# Patient Record
Sex: FEMALE | Race: WHITE | NOT HISPANIC OR LATINO | URBAN - METROPOLITAN AREA
[De-identification: names, ages, dates, MRNs, and addresses within clinical notes are randomized per-mention and may not be internally consistent; named-entity substitution may affect disease eponyms.]

---

## 2018-08-06 ENCOUNTER — INPATIENT (INPATIENT)
Facility: HOSPITAL | Age: 58
LOS: 3 days | Discharge: HOME | End: 2018-08-10
Attending: HOSPITALIST | Admitting: HOSPITALIST
Payer: COMMERCIAL

## 2018-08-06 VITALS
HEART RATE: 96 BPM | SYSTOLIC BLOOD PRESSURE: 175 MMHG | DIASTOLIC BLOOD PRESSURE: 105 MMHG | RESPIRATION RATE: 18 BRPM | OXYGEN SATURATION: 100 % | TEMPERATURE: 98 F

## 2018-08-06 DIAGNOSIS — F10.239 ALCOHOL DEPENDENCE WITH WITHDRAWAL, UNSPECIFIED: ICD-10-CM

## 2018-08-06 DIAGNOSIS — G62.9 POLYNEUROPATHY, UNSPECIFIED: ICD-10-CM

## 2018-08-06 DIAGNOSIS — Z87.898 PERSONAL HISTORY OF OTHER SPECIFIED CONDITIONS: ICD-10-CM

## 2018-08-06 DIAGNOSIS — Z29.9 ENCOUNTER FOR PROPHYLACTIC MEASURES, UNSPECIFIED: ICD-10-CM

## 2018-08-06 LAB
ALBUMIN SERPL ELPH-MCNC: 4.9 G/DL — SIGNIFICANT CHANGE UP (ref 3.3–5)
ALBUMIN SERPL ELPH-MCNC: 5.7 G/DL — HIGH (ref 3.3–5)
ALP SERPL-CCNC: 70 U/L — SIGNIFICANT CHANGE UP (ref 40–120)
ALP SERPL-CCNC: 81 U/L — SIGNIFICANT CHANGE UP (ref 40–120)
ALT FLD-CCNC: 43 U/L — HIGH (ref 4–33)
ALT FLD-CCNC: 57 U/L — HIGH (ref 4–33)
AMPHET UR-MCNC: NEGATIVE — SIGNIFICANT CHANGE UP
AMYLASE P1 CFR SERPL: 55 U/L — SIGNIFICANT CHANGE UP (ref 25–125)
APAP SERPL-MCNC: < 15 UG/ML — LOW (ref 15–25)
APPEARANCE UR: CLEAR — SIGNIFICANT CHANGE UP
AST SERPL-CCNC: 42 U/L — HIGH (ref 4–32)
AST SERPL-CCNC: 55 U/L — HIGH (ref 4–32)
BACTERIA # UR AUTO: SIGNIFICANT CHANGE UP
BARBITURATES UR SCN-MCNC: NEGATIVE — SIGNIFICANT CHANGE UP
BASE EXCESS BLDV CALC-SCNC: 4.5 MMOL/L — SIGNIFICANT CHANGE UP
BASOPHILS # BLD AUTO: 0.09 K/UL — SIGNIFICANT CHANGE UP (ref 0–0.2)
BASOPHILS NFR BLD AUTO: 1.1 % — SIGNIFICANT CHANGE UP (ref 0–2)
BENZODIAZ UR-MCNC: NEGATIVE — SIGNIFICANT CHANGE UP
BILIRUB DIRECT SERPL-MCNC: 0.1 MG/DL — SIGNIFICANT CHANGE UP (ref 0.1–0.2)
BILIRUB SERPL-MCNC: 0.5 MG/DL — SIGNIFICANT CHANGE UP (ref 0.2–1.2)
BILIRUB SERPL-MCNC: 0.5 MG/DL — SIGNIFICANT CHANGE UP (ref 0.2–1.2)
BILIRUB UR-MCNC: NEGATIVE — SIGNIFICANT CHANGE UP
BLOOD GAS VENOUS - CREATININE: 0.37 MG/DL — LOW (ref 0.5–1.3)
BLOOD UR QL VISUAL: NEGATIVE — SIGNIFICANT CHANGE UP
BUN SERPL-MCNC: 11 MG/DL — SIGNIFICANT CHANGE UP (ref 7–23)
CALCIUM SERPL-MCNC: 9.6 MG/DL — SIGNIFICANT CHANGE UP (ref 8.4–10.5)
CANNABINOIDS UR-MCNC: NEGATIVE — SIGNIFICANT CHANGE UP
CHLORIDE BLDV-SCNC: 102 MMOL/L — SIGNIFICANT CHANGE UP (ref 96–108)
CHLORIDE SERPL-SCNC: 96 MMOL/L — LOW (ref 98–107)
CO2 SERPL-SCNC: 25 MMOL/L — SIGNIFICANT CHANGE UP (ref 22–31)
COCAINE METAB.OTHER UR-MCNC: NEGATIVE — SIGNIFICANT CHANGE UP
COLOR SPEC: SIGNIFICANT CHANGE UP
CREAT SERPL-MCNC: 0.48 MG/DL — LOW (ref 0.5–1.3)
EOSINOPHIL # BLD AUTO: 0.01 K/UL — SIGNIFICANT CHANGE UP (ref 0–0.5)
EOSINOPHIL NFR BLD AUTO: 0.1 % — SIGNIFICANT CHANGE UP (ref 0–6)
ETHANOL BLD-MCNC: < 10 MG/DL — SIGNIFICANT CHANGE UP
GAS PNL BLDV: 137 MMOL/L — SIGNIFICANT CHANGE UP (ref 136–146)
GLUCOSE BLDV-MCNC: 92 — SIGNIFICANT CHANGE UP (ref 70–99)
GLUCOSE SERPL-MCNC: 93 MG/DL — SIGNIFICANT CHANGE UP (ref 70–99)
GLUCOSE UR-MCNC: NEGATIVE — SIGNIFICANT CHANGE UP
HCO3 BLDV-SCNC: 27 MMOL/L — SIGNIFICANT CHANGE UP (ref 20–27)
HCT VFR BLD CALC: 39.4 % — SIGNIFICANT CHANGE UP (ref 34.5–45)
HCT VFR BLDV CALC: 42.7 % — SIGNIFICANT CHANGE UP (ref 34.5–45)
HGB BLD-MCNC: 13.2 G/DL — SIGNIFICANT CHANGE UP (ref 11.5–15.5)
HGB BLDV-MCNC: 13.9 G/DL — SIGNIFICANT CHANGE UP (ref 11.5–15.5)
IMM GRANULOCYTES # BLD AUTO: 0.07 # — SIGNIFICANT CHANGE UP
IMM GRANULOCYTES NFR BLD AUTO: 0.9 % — SIGNIFICANT CHANGE UP (ref 0–1.5)
KETONES UR-MCNC: SIGNIFICANT CHANGE UP
LACTATE BLDV-MCNC: 1.8 MMOL/L — SIGNIFICANT CHANGE UP (ref 0.5–2)
LEUKOCYTE ESTERASE UR-ACNC: HIGH
LIDOCAIN IGE QN: 33.3 U/L — SIGNIFICANT CHANGE UP (ref 7–60)
LYMPHOCYTES # BLD AUTO: 0.81 K/UL — LOW (ref 1–3.3)
LYMPHOCYTES # BLD AUTO: 10.1 % — LOW (ref 13–44)
MAGNESIUM SERPL-MCNC: 1.7 MG/DL — SIGNIFICANT CHANGE UP (ref 1.6–2.6)
MAGNESIUM SERPL-MCNC: 1.8 MG/DL — SIGNIFICANT CHANGE UP (ref 1.6–2.6)
MCHC RBC-ENTMCNC: 30.3 PG — SIGNIFICANT CHANGE UP (ref 27–34)
MCHC RBC-ENTMCNC: 33.5 % — SIGNIFICANT CHANGE UP (ref 32–36)
MCV RBC AUTO: 90.4 FL — SIGNIFICANT CHANGE UP (ref 80–100)
METHADONE UR-MCNC: NEGATIVE — SIGNIFICANT CHANGE UP
MONOCYTES # BLD AUTO: 0.58 K/UL — SIGNIFICANT CHANGE UP (ref 0–0.9)
MONOCYTES NFR BLD AUTO: 7.3 % — SIGNIFICANT CHANGE UP (ref 2–14)
NEUTROPHILS # BLD AUTO: 6.44 K/UL — SIGNIFICANT CHANGE UP (ref 1.8–7.4)
NEUTROPHILS NFR BLD AUTO: 80.5 % — HIGH (ref 43–77)
NITRITE UR-MCNC: NEGATIVE — SIGNIFICANT CHANGE UP
NRBC # FLD: 0 — SIGNIFICANT CHANGE UP
OPIATES UR-MCNC: NEGATIVE — SIGNIFICANT CHANGE UP
OXYCODONE UR-MCNC: NEGATIVE — SIGNIFICANT CHANGE UP
PCO2 BLDV: 40 MMHG — LOW (ref 41–51)
PCP UR-MCNC: NEGATIVE — SIGNIFICANT CHANGE UP
PH BLDV: 7.46 PH — HIGH (ref 7.32–7.43)
PH UR: 8 — SIGNIFICANT CHANGE UP (ref 4.6–8)
PHOSPHATE SERPL-MCNC: 2.1 MG/DL — LOW (ref 2.5–4.5)
PHOSPHATE SERPL-MCNC: 2.2 MG/DL — LOW (ref 2.5–4.5)
PLATELET # BLD AUTO: 345 K/UL — SIGNIFICANT CHANGE UP (ref 150–400)
PMV BLD: 9 FL — SIGNIFICANT CHANGE UP (ref 7–13)
PO2 BLDV: 26 MMHG — LOW (ref 35–40)
POTASSIUM BLDV-SCNC: 3.7 MMOL/L — SIGNIFICANT CHANGE UP (ref 3.4–4.5)
POTASSIUM SERPL-MCNC: 4 MMOL/L — SIGNIFICANT CHANGE UP (ref 3.5–5.3)
POTASSIUM SERPL-SCNC: 4 MMOL/L — SIGNIFICANT CHANGE UP (ref 3.5–5.3)
PROT SERPL-MCNC: 7.5 G/DL — SIGNIFICANT CHANGE UP (ref 6–8.3)
PROT SERPL-MCNC: 8.4 G/DL — HIGH (ref 6–8.3)
PROT UR-MCNC: NEGATIVE MG/DL — SIGNIFICANT CHANGE UP
RBC # BLD: 4.36 M/UL — SIGNIFICANT CHANGE UP (ref 3.8–5.2)
RBC # FLD: 13.8 % — SIGNIFICANT CHANGE UP (ref 10.3–14.5)
RBC CASTS # UR COMP ASSIST: SIGNIFICANT CHANGE UP (ref 0–?)
SALICYLATES SERPL-MCNC: < 5 MG/DL — LOW (ref 15–30)
SAO2 % BLDV: 45.7 % — LOW (ref 60–85)
SODIUM SERPL-SCNC: 140 MMOL/L — SIGNIFICANT CHANGE UP (ref 135–145)
SP GR SPEC: 1 — SIGNIFICANT CHANGE UP (ref 1–1.04)
TSH SERPL-MCNC: 0.91 UIU/ML — SIGNIFICANT CHANGE UP (ref 0.27–4.2)
UROBILINOGEN FLD QL: NORMAL MG/DL — SIGNIFICANT CHANGE UP
WBC # BLD: 8 K/UL — SIGNIFICANT CHANGE UP (ref 3.8–10.5)
WBC # FLD AUTO: 8 K/UL — SIGNIFICANT CHANGE UP (ref 3.8–10.5)
WBC UR QL: SIGNIFICANT CHANGE UP (ref 0–?)

## 2018-08-06 PROCEDURE — 99223 1ST HOSP IP/OBS HIGH 75: CPT

## 2018-08-06 RX ORDER — MAGNESIUM SULFATE 500 MG/ML
1 VIAL (ML) INJECTION ONCE
Qty: 0 | Refills: 0 | Status: COMPLETED | OUTPATIENT
Start: 2018-08-06 | End: 2018-08-06

## 2018-08-06 RX ADMIN — Medication 50 MILLIGRAM(S): at 16:45

## 2018-08-06 RX ADMIN — Medication 100 GRAM(S): at 22:58

## 2018-08-06 RX ADMIN — Medication 50 MILLIGRAM(S): at 15:22

## 2018-08-06 RX ADMIN — Medication 2 MILLIGRAM(S): at 21:58

## 2018-08-06 NOTE — H&P ADULT - NSHPREVIEWOFSYSTEMS_GEN_ALL_CORE
Review of Systems:   CONSTITUTIONAL: No fever, weight loss, or fatigue  EYES: No eye pain, visual disturbances, or discharge  ENMT:  No difficulty hearing, tinnitus, vertigo; No sinus or throat pain  NECK: No pain or stiffness  RESPIRATORY: No cough, wheezing, chills or hemoptysis; No shortness of breath  CARDIOVASCULAR: No chest pain, palpitations, dizziness, or leg swelling  GASTROINTESTINAL: No abdominal or epigastric pain. No nausea, vomiting, or hematemesis; No diarrhea or constipation. No melena or hematochezia.  GENITOURINARY: No dysuria, frequency, hematuria, or incontinence  NEUROLOGICAL: No headaches, memory loss, loss of strength, numbness, or tremors  SKIN: No itching, burning, rashes, or lesions   LYMPH NODES: No enlarged glands  ENDOCRINE: No heat or cold intolerance; No hair loss  MUSCULOSKELETAL: No joint pain or swelling; No muscle, back, or extremity pain  PSYCHIATRIC: + depression  HEME/LYMPH: No easy bruising, or bleeding gums  ALLERY AND IMMUNOLOGIC: No hives or eczema

## 2018-08-06 NOTE — ED ADULT NURSE REASSESSMENT NOTE - NS ED NURSE REASSESS COMMENT FT1
Pt moved to ambulance bay for closer monitoring until room assignment.  Ambulance triage RN Barbie given report.   with pt.

## 2018-08-06 NOTE — H&P ADULT - NSHPLABSRESULTS_GEN_ALL_CORE
Vital Signs Last 24 Hrs  T(C): 36.7 (06 Aug 2018 22:00), Max: 37 (06 Aug 2018 18:24)  T(F): 98 (06 Aug 2018 22:00), Max: 98.6 (06 Aug 2018 18:24)  HR: 83 (06 Aug 2018 22:00) (83 - 97)  BP: 142/87 (06 Aug 2018 22:00) (142/87 - 192/105)  BP(mean): --  RR: 18 (06 Aug 2018 22:00) (16 - 18)  SpO2: 98% (06 Aug 2018 22:00) (97% - 100%)                          13.2   8.00  )-----------( 345      ( 06 Aug 2018 13:45 )             39.4     08-06    140  |  96<L>  |  11  ----------------------------<  93  4.0   |  25  |  0.48<L>    Ca    9.6      06 Aug 2018 13:45  Phos  2.1     08-  Mg     1.7     08-    TPro  8.4<H>  /  Alb  5.7<H>  /  TBili  0.5  /  DBili  x   /  AST  55<H>  /  ALT  57<H>  /  AlkPhos  81  08-06    CAPILLARY BLOOD GLUCOSE      POCT Blood Glucose.: 89 mg/dL (06 Aug 2018 15:14)      Urinalysis Basic - ( 06 Aug 2018 17:14 )    Color: LT. YELLOW / Appearance: CLEAR / S.005 / pH: 8.0  Gluc: NEGATIVE / Ketone: TRACE  / Bili: NEGATIVE / Urobili: NORMAL mg/dL   Blood: NEGATIVE / Protein: NEGATIVE mg/dL / Nitrite: NEGATIVE   Leuk Esterase: SMALL / RBC: 0-2 / WBC 10-25   Sq Epi: x / Non Sq Epi: x / Bacteria: FEW

## 2018-08-06 NOTE — SBIRT NOTE. - NSSBIRTSERVICES_GEN_A_ED_FT
Provided SBIRT services: Full screen positive. Referral to Treatment Performed. Screening results were reviewed with the patient and patient was provided information about healthy guidelines and potential negative consequences associated with level of risk. Motivation and readiness to reduce or stop use was discussed and goals and activities to make changes were suggested/offered.    Referral for complete assessment and level of care determination at a certified treatment facility was completed by contacting the treatment facility via phone, and add apt info as noted below:  Appt confirmed at: [facility] Outpatient tx 8/9 1:30pm  Audit Score: 26  DAST Score: 0  Duration = 30 Minutes

## 2018-08-06 NOTE — ED ADULT TRIAGE NOTE - CHIEF COMPLAINT QUOTE
"I have been drinking too much."  Sent by PMD for eval of alcohol dependence and ?DT's.   Pt last drink last night.  tremulous in triage.   h/o ETOH

## 2018-08-06 NOTE — ED PROVIDER NOTE - OBJECTIVE STATEMENT
59 yo F c PMH of alcohol dependence p/w alcohol withdrawal. Pt drinks on and off for 15 years 57 yo F c PMH of alcohol dependence p/w alcohol withdrawal. Pt drinks on and off for 15 years, q week or q other week. pt has drank 1L of vodka q day for the past 4 days, last drink last night, unknown time. Pt's bilat hands became stiff, went to PMD who sent pt in for "DTs," however pt denies ever having a seizure, pt denies hx of hospitalization for alcohol related sx. not on blood thinners. denies head trauma or LOC.     ROS positive: hand stiffness, alcohol use, n/v  ROS negative: f/c, CP, SOB, abdominal pain, hematemesis, bloody stool, head trauma, LOC, seizure

## 2018-08-06 NOTE — H&P ADULT - PROBLEM SELECTOR PLAN 1
-CIWA  -fall and seizure precautions   -pt has appointment for detox this Thursday at [facility]

## 2018-08-06 NOTE — ED PROVIDER NOTE - PHYSICAL EXAMINATION
NEURO: pupils 3 mm, PERRL, EOMI (CN III, IV, VI), facial sensation intact to light touch in all 3 divisions bilat (CN V), face is symmetric with normal eye closure, eye opening, and smile (CN VII), hearing is normal to rubbing fingers (CN VII), palate elevates symmetrically, phonation is normal (CN IX, X),  shoulder shrug intact bilat (CN XI), tongue is midline with nl movements and no atrophy (CN XII), finger to nose test nl bilat,  speech is clear; 5/5 motor strength bilat hand  and bilat plantar flexion; sensation intact to light touch BLE and BLE C5-T1 and L3-S1; gait wnl

## 2018-08-06 NOTE — ED ADULT NURSE NOTE - OBJECTIVE STATEMENT
RN Break Coverage: received pt to room 28 for evaluation, sent by PMD for etoh abuse x 3days now tremulous with vomiting x 4 today. pt family reports pt had an episode of unresponsiveness and arm "stiffness" today, with intermittent episodes of confusion over the past few days, and not getting out of bed. pt admits to drinking a pint of vodka a day over the past few days, last drink 8pm last night. denies any additional tobacco or substance abuse. pt noted to be tremulous, awake a&ox4, appropriately answering questions. denies ha, dizziness or visual disturbances. denies ah/vh/hi/si. pt calm, cooperative. skin warm, dry, flushed, intact. respirations even unlabored. denies cp or sob. abdomen soft nontender nondistended. endorses nausea, no active vomiting, but endorses vomit was non-bloody. denies diarrhea or constipation. denies fevers or chills. ivl placed. bloods drawn and sent. vs as noted. safety maintained, family at bedside.

## 2018-08-06 NOTE — ED ADULT NURSE NOTE - INTERVENTIONS DEFINITIONS
Non-slip footwear when patient is off stretcher/Call bell, personal items and telephone within reach/Physically safe environment: no spills, clutter or unnecessary equipment/Reinforce activity limits and safety measures with patient and family/Instruct patient to call for assistance/[place] to call system/Stretcher in lowest position, wheels locked, appropriate side rails in place/Monitor gait and stability

## 2018-08-06 NOTE — ED PROVIDER NOTE - MEDICAL DECISION MAKING DETAILS
59 yo F c PMH of alcohol dependence p/w alcohol withdrawal, sent by PMD for "DT", pt states her bilat hands were stiff but denies seizure activity. On exam, HR 96, /105, VS otherwise wnl, non-focal neuro exam, no resting tremor or tongue fasciculations. labs pending, IVF for tx, will reassess.

## 2018-08-06 NOTE — H&P ADULT - HISTORY OF PRESENT ILLNESS
57 yo f with long-standing h/o etoh abuse, detox attempts with Antabuse. At behest of spouse comes to ed after recent 4 day drinking binge (1 L vodka each of last 4 days) now with tremors, anxiety; HR max 97, bp 175/105. Pt denies prior h/o seizures or withdrawal. 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 Pt also notes stopping zoloft last month for depression after 6 months of taking. Felt it wasn't helping; denies worsening depressive symptoms SI or intent to self-harm.  Notes improved symptoms  since librium given in ed 57 yo f with long-standing h/o etoh abuse, detox attempts with Antabuse. At behest of spouse comes to ed after recent 4 day drinking binge (1 L vodka each of last 4 days) now with tremors, anxiety; HR max 97, bp 175/105. Pt denies prior h/o seizures or withdrawal. 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 Pt also notes stopping zoloft last month for depression after 6 months of taking. Felt it wasn't helping; denies worsening depressive symptoms SI or intent to self-harm.  Notes improved symptoms  since librium given in ed  Reports BL hand numbness and weakness for 15 minutes that spontaneously resolved  Notes 2 syncopal events in last year; Last incident  6 months ago. Both occurred while in bathroom. Denies recent lightheadedness, falls, palpitations, dizziness, sob

## 2018-08-06 NOTE — H&P ADULT - NSHPPHYSICALEXAM_GEN_ALL_CORE
PHYSICAL EXAM:      Constitutional: NAD, well-groomed, well-developed  HEENT: EOMI, Normal Hearing  Neck: No LAD, No JVD  Back: Normal spine flexure, No CVA tenderness  Respiratory: CTABCardiovascular: S1 and S2, RRR, no M/G/R  Gastrointestinal: BS+, soft, NT/ND  Extremities: No peripheral edema  Vascular: 2+ peripheral pulses  Neurological: A/O x 3, mild diffuse tremulousness. No numbness  Psychiatric: Normal mood, normal affect. Denies intent to self-harm or SI  Musculoskeletal: 5/5 strength b/l upper and lower extremities  Skin: No rashes

## 2018-08-06 NOTE — ED PROVIDER NOTE - ENMT, MLM
Airway patent, Nasal mucosa clear. Mouth with normal mucosa. Throat has no vesicles, no oropharyngeal exudates, no tongue fasciculations

## 2018-08-06 NOTE — H&P ADULT - PROBLEM SELECTOR PLAN 2
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

## 2018-08-06 NOTE — ED PROVIDER NOTE - PROGRESS NOTE DETAILS
on reassessment pt in NAD, no tremors or tongue fasciculations, /105  will give 2nd dose of librium 50 mg and admit

## 2018-08-06 NOTE — ED PROVIDER NOTE - ATTENDING CONTRIBUTION TO CARE
I have seen and examined the patient face to face, have reviewed and addended the HPI, PE and a/p as necessary.    57 yo F with ETOH dependence a/w ETOH withdrawal after seeing her PMD who reported concerns for delirium tremens.  Pt reported drinking 1L of vodka daily x 4 days with last drink yesterday.  No history of seizures, never had withdrawals.    PE: psychomotor agitation, constantly moving, hypertensive on vitals with tachycardic on exam.  Mild tremor noted, S1S2 tachycardic no murmur gallops rubs, ctabl, abd soft ntnd no rebound no guarding, no le edema, mild tremor noted.  CIWA on my exam 10.      57 yo F a/w ETOH withdrawal here with CIWA 10  - Librium 50mg now  - CIWA eval   - DIscussed with patient options for detox and or outpt rehab.  Pt declined at this time would like medical management at this time.  Reports never having ETOH seizures and withdrawal prior.

## 2018-08-07 LAB
ALBUMIN SERPL ELPH-MCNC: 4.7 G/DL — SIGNIFICANT CHANGE UP (ref 3.3–5)
ALP SERPL-CCNC: 74 U/L — SIGNIFICANT CHANGE UP (ref 40–120)
ALT FLD-CCNC: 44 U/L — HIGH (ref 4–33)
AST SERPL-CCNC: 46 U/L — HIGH (ref 4–32)
BILIRUB SERPL-MCNC: 0.6 MG/DL — SIGNIFICANT CHANGE UP (ref 0.2–1.2)
BUN SERPL-MCNC: 14 MG/DL — SIGNIFICANT CHANGE UP (ref 7–23)
CALCIUM SERPL-MCNC: 9.9 MG/DL — SIGNIFICANT CHANGE UP (ref 8.4–10.5)
CHLORIDE SERPL-SCNC: 97 MMOL/L — LOW (ref 98–107)
CO2 SERPL-SCNC: 23 MMOL/L — SIGNIFICANT CHANGE UP (ref 22–31)
CREAT SERPL-MCNC: 0.5 MG/DL — SIGNIFICANT CHANGE UP (ref 0.5–1.3)
GLUCOSE SERPL-MCNC: 94 MG/DL — SIGNIFICANT CHANGE UP (ref 70–99)
HCT VFR BLD CALC: 39.8 % — SIGNIFICANT CHANGE UP (ref 34.5–45)
HGB BLD-MCNC: 13.5 G/DL — SIGNIFICANT CHANGE UP (ref 11.5–15.5)
MAGNESIUM SERPL-MCNC: 2.2 MG/DL — SIGNIFICANT CHANGE UP (ref 1.6–2.6)
MAGNESIUM SERPL-MCNC: 2.2 MG/DL — SIGNIFICANT CHANGE UP (ref 1.6–2.6)
MCHC RBC-ENTMCNC: 31.2 PG — SIGNIFICANT CHANGE UP (ref 27–34)
MCHC RBC-ENTMCNC: 33.9 % — SIGNIFICANT CHANGE UP (ref 32–36)
MCV RBC AUTO: 91.9 FL — SIGNIFICANT CHANGE UP (ref 80–100)
NRBC # FLD: 0 — SIGNIFICANT CHANGE UP
PHOSPHATE SERPL-MCNC: 3.5 MG/DL — SIGNIFICANT CHANGE UP (ref 2.5–4.5)
PHOSPHATE SERPL-MCNC: 3.6 MG/DL — SIGNIFICANT CHANGE UP (ref 2.5–4.5)
PLATELET # BLD AUTO: 288 K/UL — SIGNIFICANT CHANGE UP (ref 150–400)
PMV BLD: 9.4 FL — SIGNIFICANT CHANGE UP (ref 7–13)
POTASSIUM SERPL-MCNC: 3.4 MMOL/L — LOW (ref 3.5–5.3)
POTASSIUM SERPL-SCNC: 3.4 MMOL/L — LOW (ref 3.5–5.3)
PROT SERPL-MCNC: 7.7 G/DL — SIGNIFICANT CHANGE UP (ref 6–8.3)
RBC # BLD: 4.33 M/UL — SIGNIFICANT CHANGE UP (ref 3.8–5.2)
RBC # FLD: 14 % — SIGNIFICANT CHANGE UP (ref 10.3–14.5)
SODIUM SERPL-SCNC: 138 MMOL/L — SIGNIFICANT CHANGE UP (ref 135–145)
WBC # BLD: 4.37 K/UL — SIGNIFICANT CHANGE UP (ref 3.8–10.5)
WBC # FLD AUTO: 4.37 K/UL — SIGNIFICANT CHANGE UP (ref 3.8–10.5)

## 2018-08-07 PROCEDURE — 99233 SBSQ HOSP IP/OBS HIGH 50: CPT

## 2018-08-07 RX ORDER — POTASSIUM CHLORIDE 20 MEQ
40 PACKET (EA) ORAL ONCE
Qty: 0 | Refills: 0 | Status: COMPLETED | OUTPATIENT
Start: 2018-08-07 | End: 2018-08-07

## 2018-08-07 RX ADMIN — Medication 2 MILLIGRAM(S): at 02:04

## 2018-08-07 RX ADMIN — Medication 2 MILLIGRAM(S): at 18:05

## 2018-08-07 RX ADMIN — Medication 2 MILLIGRAM(S): at 11:03

## 2018-08-07 RX ADMIN — Medication 40 MILLIEQUIVALENT(S): at 18:05

## 2018-08-07 RX ADMIN — Medication 2 MILLIGRAM(S): at 15:03

## 2018-08-07 RX ADMIN — Medication 2 MILLIGRAM(S): at 05:48

## 2018-08-07 RX ADMIN — Medication 1.5 MILLIGRAM(S): at 21:59

## 2018-08-07 RX ADMIN — Medication 2 MILLIGRAM(S): at 04:00

## 2018-08-08 DIAGNOSIS — R30.0 DYSURIA: ICD-10-CM

## 2018-08-08 LAB
ALBUMIN SERPL ELPH-MCNC: 4.6 G/DL — SIGNIFICANT CHANGE UP (ref 3.3–5)
ALP SERPL-CCNC: 67 U/L — SIGNIFICANT CHANGE UP (ref 40–120)
ALT FLD-CCNC: 41 U/L — HIGH (ref 4–33)
APPEARANCE UR: SIGNIFICANT CHANGE UP
AST SERPL-CCNC: 43 U/L — HIGH (ref 4–32)
BACTERIA # UR AUTO: HIGH
BILIRUB SERPL-MCNC: 0.5 MG/DL — SIGNIFICANT CHANGE UP (ref 0.2–1.2)
BILIRUB UR-MCNC: NEGATIVE — SIGNIFICANT CHANGE UP
BLOOD UR QL VISUAL: HIGH
BUN SERPL-MCNC: 21 MG/DL — SIGNIFICANT CHANGE UP (ref 7–23)
CALCIUM SERPL-MCNC: 9.9 MG/DL — SIGNIFICANT CHANGE UP (ref 8.4–10.5)
CHLORIDE SERPL-SCNC: 101 MMOL/L — SIGNIFICANT CHANGE UP (ref 98–107)
CO2 SERPL-SCNC: 24 MMOL/L — SIGNIFICANT CHANGE UP (ref 22–31)
COLOR SPEC: YELLOW — SIGNIFICANT CHANGE UP
CREAT SERPL-MCNC: 0.55 MG/DL — SIGNIFICANT CHANGE UP (ref 0.5–1.3)
GLUCOSE SERPL-MCNC: 99 MG/DL — SIGNIFICANT CHANGE UP (ref 70–99)
GLUCOSE UR-MCNC: NEGATIVE — SIGNIFICANT CHANGE UP
KETONES UR-MCNC: NEGATIVE — SIGNIFICANT CHANGE UP
LEUKOCYTE ESTERASE UR-ACNC: HIGH
MAGNESIUM SERPL-MCNC: 2.1 MG/DL — SIGNIFICANT CHANGE UP (ref 1.6–2.6)
MUCOUS THREADS # UR AUTO: SIGNIFICANT CHANGE UP
NITRITE UR-MCNC: POSITIVE — HIGH
PH UR: 8 — SIGNIFICANT CHANGE UP (ref 4.6–8)
PHOSPHATE SERPL-MCNC: 3.8 MG/DL — SIGNIFICANT CHANGE UP (ref 2.5–4.5)
POTASSIUM SERPL-MCNC: 4 MMOL/L — SIGNIFICANT CHANGE UP (ref 3.5–5.3)
POTASSIUM SERPL-SCNC: 4 MMOL/L — SIGNIFICANT CHANGE UP (ref 3.5–5.3)
PROT SERPL-MCNC: 7.4 G/DL — SIGNIFICANT CHANGE UP (ref 6–8.3)
PROT UR-MCNC: 30 MG/DL — HIGH
RBC CASTS # UR COMP ASSIST: SIGNIFICANT CHANGE UP (ref 0–?)
SODIUM SERPL-SCNC: 138 MMOL/L — SIGNIFICANT CHANGE UP (ref 135–145)
SP GR SPEC: 1.02 — SIGNIFICANT CHANGE UP (ref 1–1.04)
SQUAMOUS # UR AUTO: SIGNIFICANT CHANGE UP
UROBILINOGEN FLD QL: NORMAL MG/DL — SIGNIFICANT CHANGE UP
WBC UR QL: >50 — HIGH (ref 0–?)

## 2018-08-08 PROCEDURE — 93306 TTE W/DOPPLER COMPLETE: CPT | Mod: 26

## 2018-08-08 PROCEDURE — 90792 PSYCH DIAG EVAL W/MED SRVCS: CPT

## 2018-08-08 PROCEDURE — 99233 SBSQ HOSP IP/OBS HIGH 50: CPT

## 2018-08-08 RX ORDER — CEFTRIAXONE 500 MG/1
1 INJECTION, POWDER, FOR SOLUTION INTRAMUSCULAR; INTRAVENOUS EVERY 24 HOURS
Qty: 0 | Refills: 0 | Status: DISCONTINUED | OUTPATIENT
Start: 2018-08-09 | End: 2018-08-10

## 2018-08-08 RX ORDER — CEFTRIAXONE 500 MG/1
1 INJECTION, POWDER, FOR SOLUTION INTRAMUSCULAR; INTRAVENOUS ONCE
Qty: 0 | Refills: 0 | Status: COMPLETED | OUTPATIENT
Start: 2018-08-08 | End: 2018-08-08

## 2018-08-08 RX ORDER — CEFTRIAXONE 500 MG/1
INJECTION, POWDER, FOR SOLUTION INTRAMUSCULAR; INTRAVENOUS
Qty: 0 | Refills: 0 | Status: DISCONTINUED | OUTPATIENT
Start: 2018-08-08 | End: 2018-08-10

## 2018-08-08 RX ORDER — FOLIC ACID 0.8 MG
1 TABLET ORAL DAILY
Qty: 0 | Refills: 0 | Status: DISCONTINUED | OUTPATIENT
Start: 2018-08-08 | End: 2018-08-10

## 2018-08-08 RX ORDER — THIAMINE MONONITRATE (VIT B1) 100 MG
100 TABLET ORAL DAILY
Qty: 0 | Refills: 0 | Status: DISCONTINUED | OUTPATIENT
Start: 2018-08-08 | End: 2018-08-10

## 2018-08-08 RX ADMIN — Medication 1.5 MILLIGRAM(S): at 15:12

## 2018-08-08 RX ADMIN — Medication 1 MILLIGRAM(S): at 22:22

## 2018-08-08 RX ADMIN — Medication 1.5 MILLIGRAM(S): at 02:00

## 2018-08-08 RX ADMIN — CEFTRIAXONE 100 GRAM(S): 500 INJECTION, POWDER, FOR SOLUTION INTRAMUSCULAR; INTRAVENOUS at 15:55

## 2018-08-08 RX ADMIN — Medication 1.5 MILLIGRAM(S): at 10:07

## 2018-08-08 RX ADMIN — Medication 1.5 MILLIGRAM(S): at 17:56

## 2018-08-08 RX ADMIN — Medication 1.5 MILLIGRAM(S): at 06:08

## 2018-08-08 RX ADMIN — Medication 100 MILLIGRAM(S): at 15:57

## 2018-08-08 RX ADMIN — Medication 1 TABLET(S): at 15:57

## 2018-08-08 RX ADMIN — Medication 1 MILLIGRAM(S): at 15:57

## 2018-08-08 NOTE — BEHAVIORAL HEALTH ASSESSMENT NOTE - NSBHCHARTREVIEWVS_PSY_A_CORE FT
Vital Signs Last 24 Hrs  T(C): 37.1 (08 Aug 2018 12:00), Max: 37.1 (08 Aug 2018 12:00)  T(F): 98.7 (08 Aug 2018 12:00), Max: 98.7 (08 Aug 2018 12:00)  HR: 87 (08 Aug 2018 12:00) (80 - 88)  BP: 112/70 (08 Aug 2018 12:00) (100/71 - 122/75)  BP(mean): --  RR: 18 (08 Aug 2018 12:00) (17 - 18)  SpO2: 97% (08 Aug 2018 12:00) (95% - 98%)

## 2018-08-08 NOTE — BEHAVIORAL HEALTH ASSESSMENT NOTE - HPI (INCLUDE ILLNESS QUALITY, SEVERITY, DURATION, TIMING, CONTEXT, MODIFYING FACTORS, ASSOCIATED SIGNS AND SYMPTOMS)
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; w/ a hx of alcohol abuse for many years, has attended outpt rehab two times which pt found to be helpful for a short period of time, has taken Antabuse which pt did not find to be helpful because she was noncompliant; w/ no legal history, but admits to driving while intoxicated with her family in the car; w/ a PPHx of depression, most recently prescribed Prozac by PMD w/ no effect on symptoms, has also taken Wellbutrin, has never seen a psychiatrist, has never been admitted to the hospital for a psychiatric reason; w/ no reported PMHx who presented to the ED, sent by PMD, for alcohol withdrawal. Pt was admitted to medicine for alcohol abuse. Psychiatry was consulted for depression/med mgmt.    Pt seen and examined at the bedside. Pt calm and cooperative for interview, became tearful when speaking about her alcohol use and the affects it has had on her and her family. Pt states that she “went on a binge” from Thursday to Sunday where she was drinking 1 pint of vodka per day (per ED admission note, pt originally stated 1 liter per day x 4 days). Pt states that her family became concerned about her and brought her to her PMD, Dr. Black, who sent pt to the ED for concern of withdrawal/seizure. Pt states that her alcohol use has increased over the last ten years. Pt states that most weeks she drinks 1 pint of vodka two times per week, 1-2 glasses of wine per night, and goes on occasional binges. Pt states that she “likes the taste” of alcohol and likes the feeling of being “buzzed”, however, states that she becomes obnoxious, but at that point it is too late. Pt states that she believes her family is “fed up” with her drinking habits, and she does not know how her  puts up with her. Pt states that she has a great relationship with her daughter who is away at college, that her drinking “flies over” her 21 y/o son’s head, and that her drinking has “destroyed” her 17 y/o son. Pt states that she would like to quit drinking, had an appointment at [facility] tomorrow 8/8 for outpt detox. Pt states that she has gone to outpt rehab two times (2 years ago and 5 years ago), which at first she found to be helpful, but states that it got repetitive. Pt states that she has also attended AA meetings, but found them to become repetitive as well. Pt has taken Antabuse in the past, but states that she was noncompliant with the medication so she did not have a good outcome. Pt states that she would not be interested in inpt rehab at this time because she does not want to “bring more shame” on her family. Pt states that only her immediate family is aware of her alcohol use. Pt states that she has never had an issue sleeping, 8 hrs per night. Pt states that her appetite is good. Pt states that she feels sad/depressed/anxious, and endorses feeling like she would be better off not here in the past. However, pt states that she would “break my 18 year-olds heart if I harmed myself”. Pt states that she was prescribed Zoloft, dosage unknown, by her PMD. Pt took Zoloft for 6 months and reports no change in mood, discontinued drug recently. Pt states that she has also taken Wellbutrin in the past, which she found to helpful. Pt states that she believes her paternal grandfather, whom she never met, abused alcohol and that her mother suffered from depression. Pt states that she has never been in trouble with the law as a result of her alcohol use, however, admits to having driven while intoxicated with her family members in the car. Pt states that she does not currently smoke cigarettes or use illicit drugs. 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 Pt states that her drinking has gotten worse this summer because she has more free time.

## 2018-08-08 NOTE — BEHAVIORAL HEALTH ASSESSMENT NOTE - CASE SUMMARY
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; w/ a hx of alcohol abuse for many years, has attended outpt rehab two times which pt found to be helpful for a short period of time, has taken Antabuse which pt did not find to be helpful because she was noncompliant; w/ no legal history, but admits to driving while intoxicated with her family in the car; w/ a PPHx of depression, most recently prescribed Prozac by PMD w/ no effect on symptoms, has also taken Wellbutrin, has never seen a psychiatrist, has never been admitted to the hospital for a psychiatric reason; w/ no reported PMHx who presented to the ED, sent by PMD, for alcohol withdrawal. Pt was admitted to medicine for alcohol abuse. Psychiatry was consulted for depression/med mgmt.    Pt seen and examined at the bedside. Pt calm and cooperative for interview, became tearful when speaking about her alcohol use and the affects it has had on her and her family. Pt expresses interest in outpt rehab.  Consider changing to a Librium taper if pt has trouble tolerating the Ativan taper.

## 2018-08-08 NOTE — BEHAVIORAL HEALTH ASSESSMENT NOTE - NSBHCHARTREVIEWLAB_PSY_A_CORE FT
Labs reviewed personally [X]                        13.5   4.37  )-----------( 288      ( 07 Aug 2018 05:45 )             39.8     Hgb Trend: 13.5<--, 13.2<--    08-08    138  |  101  |  21  ----------------------------<  99  4.0   |  24  |  0.55    Ca    9.9      08 Aug 2018 06:05  Phos  3.8     08-  Mg     2.1         TPro  7.4  /  Alb  4.6  /  TBili  0.5  /  DBili  x   /  AST  43<H>  /  ALT  41<H>  /  AlkPhos  67  08-08    Creatinine Trend: 0.55<--, 0.50<--, 0.48<--    Urinalysis Basic - ( 06 Aug 2018 17:14 )    Color: LT. YELLOW / Appearance: CLEAR / S.005 / pH: 8.0  Gluc: NEGATIVE / Ketone: TRACE  / Bili: NEGATIVE / Urobili: NORMAL mg/dL   Blood: NEGATIVE / Protein: NEGATIVE mg/dL / Nitrite: NEGATIVE   Leuk Esterase: SMALL / RBC: 0-2 / WBC 10-25   Sq Epi: x / Non Sq Epi: x / Bacteria: FEW

## 2018-08-08 NOTE — BEHAVIORAL HEALTH ASSESSMENT NOTE - SUMMARY
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; w/ a hx of alcohol abuse for many years, has attended outpt rehab two times which pt found to be helpful for a short period of time, has taken Antabuse which pt did not find to be helpful because she was noncompliant; w/ no legal history, but admits to driving while intoxicated with her family in the car; w/ a PPHx of depression, most recently prescribed Prozac by PMD w/ no effect on symptoms, has also taken Wellbutrin, has never seen a psychiatrist, has never been admitted to the hospital for a psychiatric reason; w/ no reported PMHx who presented to the ED, sent by PMD, for alcohol withdrawal. Pt was admitted to medicine for alcohol abuse. Psychiatry was consulted for depression/med mgmt.    Pt seen and examined at the bedside. Pt calm and cooperative for interview, became tearful when speaking about her alcohol use and the affects it has had on her and her family. Pt expresses interest in outpt rehab.    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; w/ a hx of alcohol abuse for many years, has attended outpt rehab two times which pt found to be helpful for a short period of time, has taken Antabuse which pt did not find to be helpful because she was noncompliant; w/ no legal history, but admits to driving while intoxicated with her family in the car; w/ a PPHx of depression, most recently prescribed Prozac by PMD w/ no effect on symptoms, has also taken Wellbutrin, has never seen a psychiatrist, has never been admitted to the hospital for a psychiatric reason; w/ no reported PMHx who presented to the ED, sent by PMD, for alcohol withdrawal. Pt was admitted to medicine for alcohol abuse. Psychiatry was consulted for depression/med mgmt.    Pt seen and examined at the bedside. Pt calm and cooperative for interview, became tearful when speaking about her alcohol use and the affects it has had on her and her family. Pt expresses interest in outpt rehab.    -Continue w/ Ativan taper  -Monitor CIWA  -Start thiamine, multivitan, folic acid  -Recommend outpt rehab at [facility]  -Team will consider starting pt on Wellbutrin once she is medically stable

## 2018-08-09 PROCEDURE — 99232 SBSQ HOSP IP/OBS MODERATE 35: CPT

## 2018-08-09 RX ADMIN — Medication 1 MILLIGRAM(S): at 05:49

## 2018-08-09 RX ADMIN — Medication 1 TABLET(S): at 11:43

## 2018-08-09 RX ADMIN — CEFTRIAXONE 100 GRAM(S): 500 INJECTION, POWDER, FOR SOLUTION INTRAMUSCULAR; INTRAVENOUS at 16:23

## 2018-08-09 RX ADMIN — Medication 1 MILLIGRAM(S): at 13:39

## 2018-08-09 RX ADMIN — Medication 1 MILLIGRAM(S): at 10:11

## 2018-08-09 RX ADMIN — Medication 1 MILLIGRAM(S): at 02:16

## 2018-08-09 RX ADMIN — Medication 100 MILLIGRAM(S): at 11:43

## 2018-08-09 RX ADMIN — Medication 1 MILLIGRAM(S): at 11:43

## 2018-08-09 RX ADMIN — Medication 0.5 MILLIGRAM(S): at 22:03

## 2018-08-09 RX ADMIN — Medication 1 MILLIGRAM(S): at 17:57

## 2018-08-09 NOTE — DISCHARGE NOTE ADULT - PATIENT PORTAL LINK FT
You can access the [facility] Patient Portal, offered by [facility], by registering with the following website: [email or web address]

## 2018-08-09 NOTE — DISCHARGE NOTE ADULT - CARE PROVIDER_API CALL
Kishan Black (MD), Medicine  [address] [place] [address]  Phone: [phone]  Fax: [phone]    [facility],   Phone: [phone]  Fax: (   )    -

## 2018-08-09 NOTE — DISCHARGE NOTE ADULT - PLAN OF CARE
Likely stemming from  alcoholic peripheral neuropathy You were treated for urinary tract infection int  hospital. Continue antibiotics as directed and monitor for signs/symptoms of infection, such as, fever/chills, burning/pain with urination, urinary frequency/hesitancy, cloudy urine, or blood in urine. Echo of heart was done in the hospital. Ejection fraction of 56%. Please follow up with your PCP for further outpatient recommendations. ativan taper You completed ativan taper in the hospital. Please follow up with outpatient [facility] clinic within 1-2 weeks for support outpatient. You completed ativan taper in the hospital. Please follow up with outpatient [facility] clinic within 1-2 weeks for support outpatient.  Continue vitamin supplementation -  Do Not Drink Alcohol You were treated for urinary tract infection int  hospital. Continue antibiotics as directed and monitor for signs/symptoms of infection, such as, fever/chills, burning/pain with urination, urinary frequency/hesitancy, cloudy urine, or blood in urine.  Continue Cipro for 2 more days after discharge.

## 2018-08-09 NOTE — DISCHARGE NOTE ADULT - HOSPITAL COURSE
58 yr old female  with long-standing h/o ETOH abuse, detox attempts with Antabuse. 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   uti-ua pos -ucx- multiple organisms ----ceftriaxone change to cipro upon discharge for 2 more days    dispo: home

## 2018-08-09 NOTE — DISCHARGE NOTE ADULT - MEDICATION SUMMARY - MEDICATIONS TO TAKE
I will START or STAY ON the medications listed below when I get home from the hospital:    traZODone 50 mg oral tablet  -- 1 tab(s) by mouth once a day (at bedtime), As needed, insomnia  -- Indication: For insomnia    Cipro 500 mg oral tablet  -- 1 tab(s) by mouth 2 times a day to be taken 8/11 and 8/12  -- Avoid prolonged or excessive exposure to direct and/or artificial sunlight while taking this medication.  Check with your doctor before becoming pregnant.  Do not take dairy products, antacids, or iron preparations within one hour of this medication.  Finish all this medication unless otherwise directed by prescriber.  Medication should be taken with plenty of water.    -- Indication: For Dysuria    buPROPion 150 mg/24 hours (XL) oral tablet, extended release  -- 1 tab(s) by mouth once a day  -- Indication: For Depression    Multiple Vitamins oral tablet  -- 1 tab(s) by mouth once a day  -- Indication: For supplement    folic acid 1 mg oral tablet  -- 1 tab(s) by mouth once a day  -- Indication: For supplement    thiamine 100 mg oral tablet  -- 1 tab(s) by mouth once a day  -- Indication: For supplement

## 2018-08-09 NOTE — DISCHARGE NOTE ADULT - PROVIDER TOKENS
TOKEN:'[id number]',FREE:[LAST:[[facility]],PHONE:[phone]],FAX:[(   )    -]]

## 2018-08-09 NOTE — DISCHARGE NOTE ADULT - CARE PLAN
Principal Discharge DX:	Alcohol withdrawal syndrome with complication  Goal:	ativan taper  Assessment and plan of treatment:	You completed ativan taper in the hospital. Please follow up with outpatient [facility] clinic within 1-2 weeks for support outpatient.  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 Continue antibiotics as directed and monitor for signs/symptoms of infection, such as, fever/chills, burning/pain with urination, urinary frequency/hesitancy, cloudy urine, or blood in urine.  Secondary Diagnosis:	H/O syncope  Assessment and plan of treatment:	Echo of heart was done in the hospital. Ejection fraction of 56%. Please follow up with your PCP for further outpatient recommendations. Principal Discharge DX:	Alcohol withdrawal syndrome with complication  Goal:	ativan taper  Assessment and plan of treatment:	You completed ativan taper in the hospital. Please follow up with outpatient [facility] clinic within 1-2 weeks for support outpatient.  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 Continue antibiotics as directed and monitor for signs/symptoms of infection, such as, fever/chills, burning/pain with urination, urinary frequency/hesitancy, cloudy urine, or blood in urine.  Continue Cipro for 2 more days after discharge.  Secondary Diagnosis:	H/O syncope  Assessment and plan of treatment:	Echo of heart was done in the hospital. Ejection fraction of 56%. Please follow up with your PCP for further outpatient recommendations.

## 2018-08-10 VITALS
HEART RATE: 92 BPM | DIASTOLIC BLOOD PRESSURE: 97 MMHG | OXYGEN SATURATION: 100 % | TEMPERATURE: 99 F | SYSTOLIC BLOOD PRESSURE: 138 MMHG | RESPIRATION RATE: 18 BRPM

## 2018-08-10 DIAGNOSIS — F32.9 MAJOR DEPRESSIVE DISORDER, SINGLE EPISODE, UNSPECIFIED: ICD-10-CM

## 2018-08-10 LAB
BACTERIA UR CULT: SIGNIFICANT CHANGE UP
SPECIMEN SOURCE: SIGNIFICANT CHANGE UP

## 2018-08-10 PROCEDURE — 99239 HOSP IP/OBS DSCHRG MGMT >30: CPT

## 2018-08-10 PROCEDURE — 99232 SBSQ HOSP IP/OBS MODERATE 35: CPT

## 2018-08-10 RX ORDER — BUPROPION HYDROCHLORIDE 150 MG/1
1 TABLET, EXTENDED RELEASE ORAL
Qty: 30 | Refills: 0
Start: 2018-08-10 | End: 2018-09-08

## 2018-08-10 RX ORDER — TRAZODONE HCL 50 MG
1 TABLET ORAL
Qty: 10 | Refills: 0
Start: 2018-08-10 | End: 2018-08-19

## 2018-08-10 RX ORDER — TRAZODONE HCL 50 MG
50 TABLET ORAL AT BEDTIME
Qty: 0 | Refills: 0 | Status: DISCONTINUED | OUTPATIENT
Start: 2018-08-10 | End: 2018-08-10

## 2018-08-10 RX ORDER — MOXIFLOXACIN HYDROCHLORIDE TABLETS, 400 MG 400 MG/1
1 TABLET, FILM COATED ORAL
Qty: 4 | Refills: 0
Start: 2018-08-10 | End: 2018-08-11

## 2018-08-10 RX ORDER — THIAMINE MONONITRATE (VIT B1) 100 MG
1 TABLET ORAL
Qty: 30 | Refills: 0
Start: 2018-08-10 | End: 2018-09-08

## 2018-08-10 RX ORDER — FOLIC ACID 0.8 MG
1 TABLET ORAL
Qty: 0 | Refills: 0 | DISCHARGE
Start: 2018-08-10

## 2018-08-10 RX ORDER — BUPROPION HYDROCHLORIDE 150 MG/1
150 TABLET, EXTENDED RELEASE ORAL DAILY
Qty: 0 | Refills: 0 | Status: DISCONTINUED | OUTPATIENT
Start: 2018-08-10 | End: 2018-08-10

## 2018-08-10 RX ADMIN — Medication 1 TABLET(S): at 11:38

## 2018-08-10 RX ADMIN — Medication 0.5 MILLIGRAM(S): at 06:06

## 2018-08-10 RX ADMIN — BUPROPION HYDROCHLORIDE 150 MILLIGRAM(S): 150 TABLET, EXTENDED RELEASE ORAL at 12:05

## 2018-08-10 RX ADMIN — Medication 100 MILLIGRAM(S): at 12:05

## 2018-08-10 RX ADMIN — Medication 0.5 MILLIGRAM(S): at 02:02

## 2018-08-10 RX ADMIN — CEFTRIAXONE 100 GRAM(S): 500 INJECTION, POWDER, FOR SOLUTION INTRAMUSCULAR; INTRAVENOUS at 11:36

## 2018-08-10 RX ADMIN — Medication 1 MILLIGRAM(S): at 11:38

## 2018-08-10 RX ADMIN — Medication 0.5 MILLIGRAM(S): at 10:28

## 2018-08-10 NOTE — PROGRESS NOTE BEHAVIORAL HEALTH - NSBHCHARTREVIEWVS_PSY_A_CORE FT
Vital Signs Last 24 Hrs  T(C): 37 (10 Aug 2018 10:00), Max: 37 (10 Aug 2018 10:00)  T(F): 98.6 (10 Aug 2018 10:00), Max: 98.6 (10 Aug 2018 10:00)  HR: 92 (10 Aug 2018 10:00) (74 - 92)  BP: 138/97 (10 Aug 2018 10:00) (108/76 - 138/97)  BP(mean): --  RR: 18 (10 Aug 2018 10:00) (16 - 18)  SpO2: 100% (10 Aug 2018 10:00) (97% - 100%)

## 2018-08-10 NOTE — PROGRESS NOTE ADULT - PROBLEM SELECTOR PLAN 4
IMPROVE VTE Individual Risk Assessment    RISK                                                          Points  [] Previous VTE                                           3  [] Thrombophilia                                        2  [] Lower limb paralysis                              2   [] Current Cancer                                       2   [] Immobilization > 24 hrs                        1  [] ICU/CCU stay > 24 hours                       1  [] Age > 60                                                   1    IMPROVE VTE Score: 0
psychiatry recommend starting Wellbutrin and trazodone for depression  outpt Psych f/u recommended
-would obtain TTE to evaluate for alcoholic cardiomyopathy  -if negative, may require outpt Holter
- TTE to evaluate for alcoholic cardiomyopathy done, normal LV function   -outpt cardiology f/u

## 2018-08-10 NOTE — PROGRESS NOTE ADULT - ATTENDING COMMENTS
DC planning after taper completed
DC  home today  Patient hemodynamically stable for discharge home  Time spent in discharge process is 45 min

## 2018-08-10 NOTE — PROGRESS NOTE ADULT - SUBJECTIVE AND OBJECTIVE BOX
Patient is a 58y old  Female who presents with a chief complaint of CIWA (06 Aug 2018 21:00)      SUBJECTIVE / OVERNIGHT EVENTS: patient seen and examined by bedside, no acute distress noted  CIWA score 0      MEDICATIONS  (STANDING):  LORazepam     Tablet   Oral   LORazepam     Tablet 2 milliGRAM(s) Oral every 4 hours  LORazepam     Tablet 1.5 milliGRAM(s) Oral every 4 hours    MEDICATIONS  (PRN):  LORazepam     Tablet 2 milliGRAM(s) Oral every 2 hours PRN Symptom-triggered 2 point increase in CIWA-Ar  LORazepam   Injectable 2 milliGRAM(s) IV Push every 1 hour PRN Symptom-triggered: each CIWA -Ar score 8 or GREATER      Vital Signs Last 24 Hrs  T(C): 36.6 (07 Aug 2018 12:00), Max: 37 (06 Aug 2018 18:24)  T(F): 97.9 (07 Aug 2018 12:00), Max: 98.6 (06 Aug 2018 18:24)  HR: 87 (07 Aug 2018 12:00) (80 - 97)  BP: 128/88 (07 Aug 2018 12:00) (120/84 - 192/105)  BP(mean): --  RR: 18 (07 Aug 2018 12:00) (16 - 18)  SpO2: 97% (07 Aug 2018 12:00) (96% - 100%)  CAPILLARY BLOOD GLUCOSE      POCT Blood Glucose.: 89 mg/dL (06 Aug 2018 15:14)    I&O's Summary      PHYSICAL EXAM:  GENERAL: NAD, well-developed  HEAD:  Atraumatic, Normocephalic  EYES: EOMI, PERRLA, conjunctiva and sclera clear  NECK: Supple,   CHEST/LUNG: Clear to auscultation bilaterally; No wheeze  HEART: Regular rate and rhythm;  ABDOMEN: Soft, Nontender, Nondistended; Bowel sounds present  EXTREMITIES:  2+ Peripheral Pulses, No clubbing, cyanosis, or edema  PSYCH: AAOx3  NEUROLOGY: non-focal, no tremors   SKIN: No rashes or lesions    LABS:                        13.5   4.37  )-----------( 288      ( 07 Aug 2018 05:45 )             39.8     08-07    138  |  97<L>  |  14  ----------------------------<  94  3.4<L>   |  23  |  0.50    Ca    9.9      07 Aug 2018 05:45  Phos  3.5     08-07  Mg     2.2     08-07    TPro  7.7  /  Alb  4.7  /  TBili  0.6  /  DBili  x   /  AST  46<H>  /  ALT  44<H>  /  AlkPhos  74  08-07          Urinalysis Basic - ( 06 Aug 2018 17:14 )    Color: LT. YELLOW / Appearance: CLEAR / S.005 / pH: 8.0  Gluc: NEGATIVE / Ketone: TRACE  / Bili: NEGATIVE / Urobili: NORMAL mg/dL   Blood: NEGATIVE / Protein: NEGATIVE mg/dL / Nitrite: NEGATIVE   Leuk Esterase: SMALL / RBC: 0-2 / WBC 10-25   Sq Epi: x / Non Sq Epi: x / Bacteria: FEW        RADIOLOGY & ADDITIONAL TESTS:    Imaging Personally Reviewed:    Consultant(s) Notes Reviewed:      Care Discussed with Consultants/Other Providers:
Patient is a 58y old  Female who presents with a chief complaint of CIWA (09 Aug 2018 17:00)      SUBJECTIVE / OVERNIGHT EVENTS: patient seen and examined by bedside at 10 AM, pt feeling better, wants to go home today, dysuria improving but not completely resolved       MEDICATIONS  (STANDING):  buPROPion XL . 150 milliGRAM(s) Oral daily  cefTRIAXone   IVPB      cefTRIAXone   IVPB 1 Gram(s) IV Intermittent every 24 hours  folic acid 1 milliGRAM(s) Oral daily  LORazepam     Tablet   Oral   LORazepam     Tablet 0.5 milliGRAM(s) Oral every 4 hours  multivitamin 1 Tablet(s) Oral daily  thiamine 100 milliGRAM(s) Oral daily    MEDICATIONS  (PRN):  LORazepam     Tablet 2 milliGRAM(s) Oral every 2 hours PRN Symptom-triggered 2 point increase in CIWA-Ar  LORazepam   Injectable 2 milliGRAM(s) IV Push every 1 hour PRN Symptom-triggered: each CIWA -Ar score 8 or GREATER  traZODone 50 milliGRAM(s) Oral at bedtime PRN insomnia      Vital Signs Last 24 Hrs  T(C): 37 (10 Aug 2018 10:00), Max: 37 (10 Aug 2018 10:00)  T(F): 98.6 (10 Aug 2018 10:00), Max: 98.6 (10 Aug 2018 10:00)  HR: 92 (10 Aug 2018 10:00) (74 - 92)  BP: 138/97 (10 Aug 2018 10:00) (108/76 - 138/97)  BP(mean): --  RR: 18 (10 Aug 2018 10:00) (18 - 18)  SpO2: 100% (10 Aug 2018 10:00) (97% - 100%)  CAPILLARY BLOOD GLUCOSE        I&O's Summary    09 Aug 2018 07:01  -  10 Aug 2018 07:00  --------------------------------------------------------  IN: 0 mL / OUT: 600 mL / NET: -600 mL        PHYSICAL EXAM:  GENERAL: NAD, well-developed  HEAD:  Atraumatic, Normocephalic  EYES: EOMI, PERRLA, conjunctiva and sclera clear  NECK: Supple,   CHEST/LUNG: Clear to auscultation bilaterally; No wheeze  HEART: Regular rate and rhythm;  ABDOMEN: Soft, Nontender, Nondistended; Bowel sounds present  EXTREMITIES:  2+ Peripheral Pulses, No clubbing, cyanosis, or edema  PSYCH: AAOx3,   NEUROLOGY: non-focal, no tremors   SKIN: No rashes or lesions      LABS:            no new labs        RADIOLOGY & ADDITIONAL TESTS:    Imaging Personally Reviewed:    Consultant(s) Notes Reviewed:  psychiatry     Care Discussed with Consultants/Other Providers: Psychiatry
Patient is a 58y old  Female who presents with a chief complaint of CIWA (06 Aug 2018 21:00)      SUBJECTIVE / OVERNIGHT EVENTS: patient seen and examined by bedside, c/o dysuria, denies headache, dizziness, SOB, CP, Palpitations , N/V/D, abdominal pain  CIWA score 0-1      MEDICATIONS  (STANDING):  LORazepam     Tablet   Oral   LORazepam     Tablet 1.5 milliGRAM(s) Oral every 4 hours  LORazepam     Tablet 1 milliGRAM(s) Oral every 4 hours    MEDICATIONS  (PRN):  LORazepam     Tablet 2 milliGRAM(s) Oral every 2 hours PRN Symptom-triggered 2 point increase in CIWA-Ar  LORazepam   Injectable 2 milliGRAM(s) IV Push every 1 hour PRN Symptom-triggered: each CIWA -Ar score 8 or GREATER      Vital Signs Last 24 Hrs  T(C): 37.1 (08 Aug 2018 12:00), Max: 37.1 (08 Aug 2018 12:00)  T(F): 98.7 (08 Aug 2018 12:00), Max: 98.7 (08 Aug 2018 12:00)  HR: 87 (08 Aug 2018 12:00) (80 - 88)  BP: 112/70 (08 Aug 2018 12:00) (100/71 - 122/75)  BP(mean): --  RR: 18 (08 Aug 2018 12:00) (17 - 18)  SpO2: 97% (08 Aug 2018 12:00) (95% - 98%)  CAPILLARY BLOOD GLUCOSE        I&O's Summary    PHYSICAL EXAM:  GENERAL: NAD, well-developed  HEAD:  Atraumatic, Normocephalic  EYES: EOMI, PERRLA, conjunctiva and sclera clear  NECK: Supple,   CHEST/LUNG: Clear to auscultation bilaterally; No wheeze  HEART: Regular rate and rhythm;  ABDOMEN: Soft, Nontender, Nondistended; Bowel sounds present  EXTREMITIES:  2+ Peripheral Pulses, No clubbing, cyanosis, or edema  PSYCH: AAOx3  NEUROLOGY: non-focal, no tremors   SKIN: No rashes or lesions        LABS:                        13.5   4.37  )-----------( 288      ( 07 Aug 2018 05:45 )             39.8     08-08    138  |  101  |  21  ----------------------------<  99  4.0   |  24  |  0.55    Ca    9.9      08 Aug 2018 06:05  Phos  3.8     08-08  Mg     2.1     08-08    TPro  7.4  /  Alb  4.6  /  TBili  0.5  /  DBili  x   /  AST  43<H>  /  ALT  41<H>  /  AlkPhos  67  08-          Urinalysis Basic - ( 06 Aug 2018 17:14 )    Color: LT. YELLOW / Appearance: CLEAR / S.005 / pH: 8.0  Gluc: NEGATIVE / Ketone: TRACE  / Bili: NEGATIVE / Urobili: NORMAL mg/dL   Blood: NEGATIVE / Protein: NEGATIVE mg/dL / Nitrite: NEGATIVE   Leuk Esterase: SMALL / RBC: 0-2 / WBC 10-25   Sq Epi: x / Non Sq Epi: x / Bacteria: FEW        RADIOLOGY & ADDITIONAL TESTS:    Imaging Personally Reviewed:    Consultant(s) Notes Reviewed:      Care Discussed with Consultants/Other Providers:
Patient is a 58y old  Female who presents with a chief complaint of CIWA (06 Aug 2018 21:00)      SUBJECTIVE / OVERNIGHT EVENTS: patient seen and examined by bedside at 11:20 Am, c/o feeling miserable,pt feeling sad about the effect alcohol is having on her personal life , denies headache, dizziness, SOB, CP, Palpitations , N/V/D, abdominal pain  still having dysuria   CIWA score 0-1       MEDICATIONS  (STANDING):  cefTRIAXone   IVPB      cefTRIAXone   IVPB 1 Gram(s) IV Intermittent every 24 hours  folic acid 1 milliGRAM(s) Oral daily  LORazepam     Tablet   Oral   LORazepam     Tablet 1 milliGRAM(s) Oral every 4 hours  LORazepam     Tablet 0.5 milliGRAM(s) Oral every 4 hours  multivitamin 1 Tablet(s) Oral daily  thiamine 100 milliGRAM(s) Oral daily    MEDICATIONS  (PRN):  LORazepam     Tablet 2 milliGRAM(s) Oral every 2 hours PRN Symptom-triggered 2 point increase in CIWA-Ar  LORazepam   Injectable 2 milliGRAM(s) IV Push every 1 hour PRN Symptom-triggered: each CIWA -Ar score 8 or GREATER      Vital Signs Last 24 Hrs  T(C): 36.8 (09 Aug 2018 12:34), Max: 36.9 (09 Aug 2018 00:00)  T(F): 98.2 (09 Aug 2018 12:34), Max: 98.5 (09 Aug 2018 00:00)  HR: 100 (09 Aug 2018 12:34) (82 - 100)  BP: 118/77 (09 Aug 2018 12:34) (108/73 - 118/77)  BP(mean): --  RR: 17 (09 Aug 2018 12:34) (17 - 18)  SpO2: 95% (09 Aug 2018 12:34) (95% - 98%)  CAPILLARY BLOOD GLUCOSE        I&O's Summary    09 Aug 2018 07:01  -  09 Aug 2018 16:38  --------------------------------------------------------  IN: 0 mL / OUT: 600 mL / NET: -600 mL        PHYSICAL EXAM:  GENERAL: pt sad and  tearful   HEAD:  Atraumatic, Normocephalic  EYES: EOMI, PERRLA, conjunctiva and sclera clear  NECK: Supple,   CHEST/LUNG: Clear to auscultation bilaterally; No wheeze  HEART: Regular rate and rhythm;  ABDOMEN: Soft, Nontender, Nondistended; Bowel sounds present  EXTREMITIES:  2+ Peripheral Pulses, No clubbing, cyanosis, or edema  PSYCH: AAOx3, tearful   NEUROLOGY: non-focal, no tremors   SKIN: No rashes or lesions  LABS:        138  |  101  |  21  ----------------------------<  99  4.0   |  24  |  0.55    Ca    9.9      08 Aug 2018 06:05  Phos  3.8       Mg     2.1         TPro  7.4  /  Alb  4.6  /  TBili  0.5  /  DBili  x   /  AST  43<H>  /  ALT  41<H>  /  AlkPhos  67            Urinalysis Basic - ( 08 Aug 2018 15:59 )    Color: YELLOW / Appearance: HAZY / S.021 / pH: 8.0  Gluc: NEGATIVE / Ketone: NEGATIVE  / Bili: NEGATIVE / Urobili: NORMAL mg/dL   Blood: SMALL / Protein: 30 mg/dL / Nitrite: POSITIVE   Leuk Esterase: LARGE / RBC: 10-25 / WBC >50   Sq Epi: OCC / Non Sq Epi: x / Bacteria: MANY        RADIOLOGY & ADDITIONAL TESTS:   < from: Transthoracic Echocardiogram (18 @ 16:53) >  OBSERVATIONS:  Mitral Valve: Normal mitral valve.  Aortic Root: Normal size aortic root. (Ao:2.8 cm).  Aortic Valve: Aortic valve not well visualized; probably  normal.  Left Atrium: Normal left atrium.  LA volume index = 15  cc/m2.  Left Ventricle: Endocardium not well visualized; grossly  normal left ventricular systolic function. Normal left  ventricular internal dimensions and wall thicknesses.  Right Heart: Normal right atrium. The right ventricle is  not well visualized. Normal tricuspid valve. Normal  pulmonic valve. Minimal pulmonic regurgitation.  Pericardium/PleuraNormal pericardium with no pericardial  effusion.    < end of copied text >    Imaging Personally Reviewed:    Consultant(s) Notes Reviewed:  psych    Care Discussed with Consultants/Other Providers:

## 2018-08-10 NOTE — PROGRESS NOTE ADULT - PROBLEM SELECTOR PLAN 1
-CIWA  -fall and seizure precautions   -pt has appointment for detox this Thursday at [facility]
-on CIWA protocol, CIWA score 0-1  -c/w ativan taper   -c/w folic acid/MVI/thiamine  -fall and seizure precautions  - Psych f/u noted, case discussed, ok to DC ativan and CIWA as pt not at risk of withdrawal at this time . Pt wants to go  home as her daughter is coming back from college and she does not patient is in the hospital
-CIWA  -fall and seizure precautions   -pt has appointment for detox this Thursday at [facility]
-on CIWA protocol,   -c/w ativan taper   -c/w folic acid/MVI/thiamine  -fall and seizure precautions

## 2018-08-10 NOTE — PROGRESS NOTE ADULT - ASSESSMENT
58 yr old female  with long-standing h/o ETOH abuse, detox attempts with Antabuse. admitted with ETOH withdrawal

## 2018-08-10 NOTE — PROGRESS NOTE ADULT - PROBLEM SELECTOR PLAN 3
-would obtain TTE to evaluate for alcoholic cardiomyopathy  -if negative, may require outpt Holter
UA on admission with small LE, 10-25 WBC  repeat ua  noted ,Ucx with multiple organisms , s/s resolving, will teat for 5 days total, will DC home lino PO cipro   c/w ceftriaxone
UA on admission with small LE, 10-25 WBC  will repeat ua and Ucx  will start ceftriaxone after urine studies sent
UA on admission with small LE, 10-25 WBC  repeat ua  noted , will f/u  Ucx  c/w ceftriaxone

## 2018-08-10 NOTE — PROGRESS NOTE BEHAVIORAL HEALTH - SUMMARY
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; w/ a hx of alcohol abuse for many years, has attended outpt rehab two times which pt found to be helpful for a short period of time, has taken Antabuse which pt did not find to be helpful because she was noncompliant; w/ no legal history, but admits to driving while intoxicated with her family in the car; w/ a PPHx of depression, most recently prescribed Prozac by PMD w/ no effect on symptoms, has also taken Wellbutrin, has never seen a psychiatrist, has never been admitted to the hospital for a psychiatric reason; w/ no reported PMHx who presented to the ED, sent by PMD, for alcohol withdrawal. Pt was admitted to medicine for alcohol abuse. Psychiatry was consulted for depression/med mgmt.    Pt seen and examined at the bedside. Pt calm and cooperative for interview, expresses eagerness to be discharged today as her daughter will be home tonight from college. Pt expresses interest in starting an antidepressant. Pt states that she will be following up outpt at [facility] for detox. May discontinue Ativan taper and CIWA at this time as pt shows no signs of withdrawal. Recommend Wellbutrin  mg PO daily. Recommend outpt follow-up at [facility]. 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; w/ a hx of alcohol abuse for many years, has attended outpt rehab two times which pt found to be helpful for a short period of time, has taken Antabuse which pt did not find to be helpful because she was noncompliant; w/ no legal history, but admits to driving while intoxicated with her family in the car; w/ a PPHx of depression, most recently prescribed Prozac by PMD w/ no effect on symptoms, has also taken Wellbutrin, has never seen a psychiatrist, has never been admitted to the hospital for a psychiatric reason; w/ no reported PMHx who presented to the ED, sent by PMD, for alcohol withdrawal. Pt was admitted to medicine for alcohol abuse. Psychiatry was consulted for depression/med mgmt.    Pt seen and examined at the bedside. Pt calm and cooperative for interview, expresses eagerness to be discharged today as her daughter will be home tonight from college. Pt expresses interest in starting an antidepressant. Pt states that she will be following up outpt at [facility] for rehab. May discontinue Ativan taper and CIWA at this time as pt shows no signs of withdrawal. Recommend Wellbutrin  mg PO daily and Trazodone 50mg po qhs. Recommend outpt follow-up at [facility].

## 2018-08-10 NOTE — PROGRESS NOTE ADULT - PROBLEM SELECTOR PROBLEM 1
Alcohol withdrawal syndrome with complication

## 2018-08-10 NOTE — PROGRESS NOTE BEHAVIORAL HEALTH - NSBHFUPINTERVALHXFT_PSY_A_CORE
Pt received no PRNs overnight. Pt seen and examined at the bedside. Pt states that her mood has improved, describes her mood as "good". Pt reports that she has been sleeping better than she has ever slept in her life. Pt also states that she feels "physically stronger". Pt is looking forward to going home today because her daughter will be visiting. Pt states that she would like to be discharged on an antidepressant. Pt states that she had good affect with Wellbutrin in the past, however, when changed to the "slow release" version she did not feel it was helpful. Pt states that her  is her "lifesaver", and that he is currently trying to reschedule outpt appointment at [facility] for detox. Pt educated on keeping alcohol out of the house. Pt received no PRNs overnight. Pt seen and examined at the bedside. Pt states that her mood has improved, describes her mood as "good". Pt reports that she has been sleeping better than she has ever slept in her life. Pt also states that she feels "physically stronger". Pt is looking forward to going home today because her daughter will be visiting. Pt states that she would like to be discharged on an antidepressant. Pt states that she had good affect with Wellbutrin in the past, however, when changed to the "slow release" version she did not feel it was helpful. Pt states that her  is her "lifesaver", and that he is currently trying to reschedule outpt appointment at [facility] for rehab. Pt educated on keeping alcohol out of the house.

## 2018-08-10 NOTE — PROGRESS NOTE ADULT - PROBLEM SELECTOR PLAN 2
-likely stemming form alcoholic peripheral neuropathy  -numbness spontaneously resolved earlier today  -will cont to monitor
-likely stemming from  alcoholic peripheral neuropathy  -numbness spontaneously resolved   -will cont to monitor
-likely stemming form alcoholic peripheral neuropathy  -numbness spontaneously resolved earlier today  -will cont to monitor
-likely stemming from  alcoholic peripheral neuropathy  -numbness spontaneously resolved   -will cont to monitor

## 2018-08-16 ENCOUNTER — OUTPATIENT (OUTPATIENT)
Dept: OUTPATIENT SERVICES | Facility: HOSPITAL | Age: 58
LOS: 1 days | Discharge: HOME | End: 2018-08-16

## 2018-08-16 PROBLEM — F10.20 ALCOHOL DEPENDENCE, UNCOMPLICATED: Chronic | Status: ACTIVE | Noted: 2018-08-06

## 2018-08-17 DIAGNOSIS — F10.20 ALCOHOL DEPENDENCE, UNCOMPLICATED: ICD-10-CM

## 2020-07-21 NOTE — PROGRESS NOTE BEHAVIORAL HEALTH - PRIMARY DX
Impression: Type 2 diabetes mellitus w/o complication: W12.8. Plan: No signs of retinopathy or neovascularization noted. Discussed ocular and systemic benefits of blood sugar control.  RTC 1yr complete exam Alcohol withdrawal syndrome with complication

## 2021-03-16 ENCOUNTER — EMERGENCY (EMERGENCY)
Facility: HOSPITAL | Age: 61
LOS: 1 days | Discharge: HOME | End: 2021-03-16
Admitting: EMERGENCY MEDICINE
Payer: COMMERCIAL

## 2021-03-16 VITALS
HEART RATE: 96 BPM | OXYGEN SATURATION: 100 % | RESPIRATION RATE: 16 BRPM | TEMPERATURE: 98 F | SYSTOLIC BLOOD PRESSURE: 122 MMHG | DIASTOLIC BLOOD PRESSURE: 81 MMHG | HEIGHT: 60 IN

## 2021-03-16 PROCEDURE — 99284 EMERGENCY DEPT VISIT MOD MDM: CPT

## 2021-03-16 PROCEDURE — 73090 X-RAY EXAM OF FOREARM: CPT | Mod: 26,RT

## 2021-03-16 PROCEDURE — 73110 X-RAY EXAM OF WRIST: CPT | Mod: 26,RT

## 2021-03-16 NOTE — ED ADULT TRIAGE NOTE - CHIEF COMPLAINT QUOTE
Pt presents to ED ambulatory from home with c/o R wrist pain. Pt was standing on a chair when she attempted to step down and misstepped and fell on her R side. Pt denies head strike, LOC, head neck or back pain. +PMS unable to move hand without pain.

## 2021-03-16 NOTE — CONSULT NOTE ADULT - SUBJECTIVE AND OBJECTIVE BOX
60yFemale c/o R wrist pain s/p mechanical fall earlier today. Patient denies head hit or LOC. Patient denies numbness or tingling in the RUE. Patient denies any other injuries.    PMH:  Alcohol dependence      PSH:  No significant past surgical history      AH:    Meds: See med rec    T(C): 36.7 (03-16-21 @ 19:45)  HR: 96 (03-16-21 @ 19:45)  BP: 122/81 (03-16-21 @ 19:45)  RR: 16 (03-16-21 @ 19:45)  SpO2: 100% (03-16-21 @ 19:45)  Wt(kg): --    Gen: NAD  PE RUE:  Skin intact,   SILT med/rad/ulnar/axillary  +AIN/PIN/Ulnar/Radial/Musc/Median,   +shoulder/elbow ROM,   wrist ROM limited 2/2 pain,   +radial pulse, soft compartments,    Secondary:  No TTP over bony landmarks, SILT BL, ROM intact BL, distal pulses palpable.    Imaging:  XR demonstrating R distal radius fracture    Procedure:  Under aseptic conditions, a hematoma block was administered to the fracture site using 10cc of 1% lidocaine. Closed reduction was performed and a well molded plaster splint was applied. The patient tolerated the procedure well and there we no complications. The patient was neurovascularly intact following reduction. Post-reduction xrays demonstrated acceptable alignment.       A/P: 60yFemale with R DR oliva  - Pain control  - NWB in sugarton splint  - Sling for comfort  - Encourage ROM of fingers and shoulder  - Ice/elevate  - Keep splint clean, dry, intact  - Counseled on the likely need for operative intervention as an outpatient  - Signs and symptoms of compartment syndrome explained to the pateint  - FU with Dr. Trejo in 1 week, call the office to schedule your appointment [phone]

## 2021-03-17 RX ORDER — IBUPROFEN 200 MG
1 TABLET ORAL
Qty: 21 | Refills: 0
Start: 2021-03-17 | End: 2021-03-23

## 2021-03-17 NOTE — ED PROVIDER NOTE - OBJECTIVE STATEMENT
59 y/o F p/w R wrist pain after fall today. Pt was standing on a chair when she attempted to step down and misstepped and fell on her R side. Pt reports pain worse with attempted use. Has not taken anything for pain. No head injury or LOC.

## 2021-03-17 NOTE — ED PROVIDER NOTE - CARE PROVIDER_API CALL
Larisa Trejo (MD; MPH)  Orthopaedic Surgery  [address], [address]  [place] [address]  Phone: [phone]  Fax: [phone]  Follow Up Time: Urgent

## 2021-03-17 NOTE — ED PROVIDER NOTE - PATIENT PORTAL LINK FT
You can access the FollowMyHealth Patient Portal offered by [facility] by registering at the following website: [email or web address]. By joining [facility]’s FollowMyHealth portal, you will also be able to view your health information using other applications (apps) compatible with our system.

## 2021-03-17 NOTE — ED PROVIDER NOTE - PROGRESS NOTE DETAILS
XR demonstrating R distal radius fracture. Ortho consulted for reduction. Pt to followup with Dr. Trejo

## 2021-11-15 ENCOUNTER — EMERGENCY (EMERGENCY)
Facility: HOSPITAL | Age: 61
LOS: 1 days | Discharge: HOME | End: 2021-11-15
Attending: EMERGENCY MEDICINE | Admitting: EMERGENCY MEDICINE
Payer: COMMERCIAL

## 2021-11-15 VITALS
SYSTOLIC BLOOD PRESSURE: 155 MMHG | HEART RATE: 110 BPM | HEIGHT: 60 IN | RESPIRATION RATE: 17 BRPM | DIASTOLIC BLOOD PRESSURE: 107 MMHG | OXYGEN SATURATION: 99 %

## 2021-11-15 VITALS
TEMPERATURE: 97 F | SYSTOLIC BLOOD PRESSURE: 149 MMHG | OXYGEN SATURATION: 100 % | DIASTOLIC BLOOD PRESSURE: 101 MMHG | RESPIRATION RATE: 16 BRPM | HEART RATE: 100 BPM

## 2021-11-15 LAB
ALBUMIN SERPL ELPH-MCNC: 5.3 G/DL — HIGH (ref 3.3–5)
ALP SERPL-CCNC: 87 U/L — SIGNIFICANT CHANGE UP (ref 40–120)
ALT FLD-CCNC: 23 U/L — SIGNIFICANT CHANGE UP (ref 4–33)
ANION GAP SERPL CALC-SCNC: 21 MMOL/L — HIGH (ref 7–14)
AST SERPL-CCNC: 32 U/L — SIGNIFICANT CHANGE UP (ref 4–32)
BILIRUB SERPL-MCNC: 0.3 MG/DL — SIGNIFICANT CHANGE UP (ref 0.2–1.2)
BUN SERPL-MCNC: 15 MG/DL — SIGNIFICANT CHANGE UP (ref 7–23)
CALCIUM SERPL-MCNC: 9.7 MG/DL — SIGNIFICANT CHANGE UP (ref 8.4–10.5)
CHLORIDE SERPL-SCNC: 101 MMOL/L — SIGNIFICANT CHANGE UP (ref 98–107)
CO2 SERPL-SCNC: 19 MMOL/L — LOW (ref 22–31)
CREAT SERPL-MCNC: 0.52 MG/DL — SIGNIFICANT CHANGE UP (ref 0.5–1.3)
ETHANOL SERPL-MCNC: 123 MG/DL — HIGH
GLUCOSE SERPL-MCNC: 77 MG/DL — SIGNIFICANT CHANGE UP (ref 70–99)
HCT VFR BLD CALC: 42.4 % — SIGNIFICANT CHANGE UP (ref 34.5–45)
HGB BLD-MCNC: 13.8 G/DL — SIGNIFICANT CHANGE UP (ref 11.5–15.5)
MAGNESIUM SERPL-MCNC: 1.6 MG/DL — SIGNIFICANT CHANGE UP (ref 1.6–2.6)
MCHC RBC-ENTMCNC: 30.5 PG — SIGNIFICANT CHANGE UP (ref 27–34)
MCHC RBC-ENTMCNC: 32.5 GM/DL — SIGNIFICANT CHANGE UP (ref 32–36)
MCV RBC AUTO: 93.8 FL — SIGNIFICANT CHANGE UP (ref 80–100)
NRBC # BLD: 0 /100 WBCS — SIGNIFICANT CHANGE UP
NRBC # FLD: 0 K/UL — SIGNIFICANT CHANGE UP
PHOSPHATE SERPL-MCNC: 2.4 MG/DL — LOW (ref 2.5–4.5)
PLATELET # BLD AUTO: 377 K/UL — SIGNIFICANT CHANGE UP (ref 150–400)
POTASSIUM SERPL-MCNC: 3.9 MMOL/L — SIGNIFICANT CHANGE UP (ref 3.5–5.3)
POTASSIUM SERPL-SCNC: 3.9 MMOL/L — SIGNIFICANT CHANGE UP (ref 3.5–5.3)
PROT SERPL-MCNC: 8.2 G/DL — SIGNIFICANT CHANGE UP (ref 6–8.3)
RBC # BLD: 4.52 M/UL — SIGNIFICANT CHANGE UP (ref 3.8–5.2)
RBC # FLD: 13.2 % — SIGNIFICANT CHANGE UP (ref 10.3–14.5)
SODIUM SERPL-SCNC: 141 MMOL/L — SIGNIFICANT CHANGE UP (ref 135–145)
WBC # BLD: 6.26 K/UL — SIGNIFICANT CHANGE UP (ref 3.8–10.5)
WBC # FLD AUTO: 6.26 K/UL — SIGNIFICANT CHANGE UP (ref 3.8–10.5)

## 2021-11-15 PROCEDURE — 99284 EMERGENCY DEPT VISIT MOD MDM: CPT

## 2021-11-15 RX ORDER — SERTRALINE 25 MG/1
1 TABLET, FILM COATED ORAL
Qty: 0 | Refills: 0 | DISCHARGE

## 2021-11-15 RX ORDER — BUPROPION HYDROCHLORIDE 150 MG/1
1 TABLET, EXTENDED RELEASE ORAL
Qty: 0 | Refills: 0 | DISCHARGE

## 2021-11-15 RX ORDER — HYDROXYZINE HCL 10 MG
0 TABLET ORAL
Qty: 0 | Refills: 0 | DISCHARGE

## 2021-11-15 RX ORDER — LISDEXAMFETAMINE DIMESYLATE 70 MG/1
1 CAPSULE ORAL
Qty: 0 | Refills: 0 | DISCHARGE

## 2021-11-15 RX ORDER — SODIUM CHLORIDE 9 MG/ML
1000 INJECTION, SOLUTION INTRAVENOUS ONCE
Refills: 0 | Status: COMPLETED | OUTPATIENT
Start: 2021-11-15 | End: 2021-11-15

## 2021-11-15 RX ORDER — GABAPENTIN 400 MG/1
1 CAPSULE ORAL
Qty: 0 | Refills: 0 | DISCHARGE

## 2021-11-15 RX ADMIN — SODIUM CHLORIDE 1000 MILLILITER(S): 9 INJECTION, SOLUTION INTRAVENOUS at 20:44

## 2021-11-15 RX ADMIN — Medication 50 MILLIGRAM(S): at 20:05

## 2021-11-15 NOTE — ED ADULT TRIAGE NOTE - CHIEF COMPLAINT QUOTE
pt states "I have a drinking problem and my  made me come in" denies si/hi/ah/vh. pt calm in triage. states "I just want to be home from work tomorrow". states she had 9 vodka drinks yesterday. pt states "I have a drinking problem and my  made me come in" denies si/hi/ah/vh. pt calm in triage. states "I just want to be home from work tomorrow". states she had 9 vodka drinks yesterday.  pt undressed, placed in BH clothing. pts clothing placed in locker by room #21, pocketbook with personal effects given to . pt brought to [facility].

## 2021-11-15 NOTE — ED ADULT NURSE NOTE - CHIEF COMPLAINT QUOTE
pt states "I have a drinking problem and my  made me come in" denies si/hi/ah/vh. pt calm in triage. states "I just want to be home from work tomorrow". states she had 9 vodka drinks yesterday.  pt undressed, placed in BH clothing. pts clothing placed in locker by room #21, pocketbook with personal effects given to . pt brought to [facility].

## 2021-11-15 NOTE — ED PROVIDER NOTE - ATTENDING CONTRIBUTION TO CARE
DR. BLOCH, ATTENDING MD-  I performed a face to face bedside interview with patient regarding history of present illness, review of symptoms and past medical history. I completed an independent physical exam.  I have discussed patient's plan of care with the resident.  Patient examined, well appearing, plethoric, perrl, no tongue fasciculations, neck supple,Lungs clear, abd soft nontender, extrem no edema, pulses intact, no tremor.

## 2021-11-15 NOTE — ED PROVIDER NOTE - OBJECTIVE STATEMENT
60 yo F h/o depression, EtOH abuse presenting 2/2 increasing alcohol use. Pt reports that her  made her come in to the ED because she has been "drinking too much". Pt reports that she drinks ~1 pint of vodka on the weekends and occasionally during the week. Last drink yesterday 11/14 in evening. Has history of stopping in the past without reported history of seizures, DTs, or severe w/d symptoms. Patient reports that she wants to quit and is interested in a detox program. Pt AAOx3, tachycardic, no tremors, tongue fasiculations, or signs of acute withdrawal at this time. Denies CP, SOB, abd pain, n/v, recent falls or trauma.

## 2021-11-15 NOTE — ED PROVIDER NOTE - PROGRESS NOTE DETAILS
Discussed with patient and  resources for alcohol cessation. Both patient and  prefer to call SBIRT hotline in morning to discuss further options including inpatient vs outpatient rehab/detox. Patient remains without signs or symptoms of withdrawal and reports feeling OK overall. Will discharge with resources and instructions to return to ED if develops s/s of withdrawal

## 2021-11-15 NOTE — ED PROVIDER NOTE - NS ED ROS FT
REVIEW OF SYSTEMS:    CONSTITUTIONAL: No weakness, fevers or chills  EYES/ENT: No visual changes;  No vertigo or throat pain   NECK: No pain or stiffness  RESPIRATORY: No cough, wheezing, hemoptysis; No shortness of breath  CARDIOVASCULAR: No chest pain or palpitations  GASTROINTESTINAL: No abdominal or epigastric pain. No nausea, vomiting, or hematemesis; No diarrhea or constipation. No melena or hematochezia.  GENITOURINARY: No dysuria, frequency or hematuria  NEUROLOGICAL: No numbness or weakness.   SKIN: No itching, rashes

## 2021-11-15 NOTE — ED PROVIDER NOTE - CLINICAL SUMMARY MEDICAL DECISION MAKING FREE TEXT BOX
60 yo F h/o depression, EtOH abuse presenting for increasing alcohol use.  Patient reports that she is interested in stopping drinking. Last drink was yesterday 11/14 per patient. Patient currently alert and oriented, no signs of acute withdrawal although patient mildly tachycardic to ~100. Patient with no specific complaints at this time. Will contact social work to discuss resources available to patient- inpatient detox vs outpatient alcohol resources. Will give 50 mg librium and monitor for signs and symptoms of alcohol withdrawal.

## 2021-11-15 NOTE — ED PROVIDER NOTE - NEUROLOGICAL, MLM
Alert and oriented, no focal deficits, no motor or sensory deficits. No tremors, no tongue fasciculations, no signs of acute alcohol withdrawal

## 2021-11-15 NOTE — ED PROVIDER NOTE - NSFOLLOWUPINSTRUCTIONS_ED_ALL_ED_FT
You came to the hospital out of concern for alcohol use. You were monitored in the Emergency Department for signs of alcohol withdrawal including tremors, agitation, or seizures. You exhibited no signs of alcohol withdrawal while in the hospital. After discussion with you and your , you were provided with information on how to contact our team which can provide you with further resources on stopping alcohol use including outpatient and inpatient options that may be available.      PLEASE RETURN TO THE EMERGENCY DEPARTMENT OR SEEK MEDICAL ATTENTION IF you have signs or symptoms of alcohol withdrawal, including but not limited to: confusion, altered mental status, difficulties with balance, nausea/vomiting, tremors, seizures, or any other symptom you feel needs immediate evaluation by a medical professional.

## 2021-12-04 NOTE — ED PROVIDER NOTE - NS ED MD TWO NIGHTS YN
VTE prop: heparin sc q12 hrs  - Dispo- . Home hospice referral requested. Patient deemed not a hospice candidate. Hospice team to reach other hospice networks       DNR/DNI VTE prop: heparin sc q12 hrs  - Dispo- . Home hospice referral requested. Patient deemed not a hospice candidate. Hospice team to reach other hospice networks       DNR/DNI VTE prop: heparin sc q12 hrs  - Dispo- . accepted to [facility] [place] today and stable for transfer       DNR/DNI Yes

## 2022-02-01 NOTE — ED PROVIDER NOTE - NSALCOHOLQUITREADY_GEN_A_CORE_SD
Anat is a 27 year old who is being evaluated via a billable video visit.      How would you like to obtain your AVS? MyChart  If the video visit is dropped, the invitation should be resent by: Text to cell phone: [phone]  Will anyone else be joining your video visit? No    Video Start Time: 2:01pm    Assessment & Plan     Encounter for other general counseling or advice on contraception  We discussed benefits, risks, side effects of mirena IUD and placement including pain, cramping, expulsion, uterine perforation and increased risk of PID with chlamydia/gonorrhea exposure. We discussed risk of PID with insertion if she does have an active chlamydia/gonorrhea infection and the risk of miscarriage if she should be pregnant. I have sent over a prescription for cytotec to take the night before a morning appointment.    She WILL NOT have G/C screening prior to IUD placement appointment and she WILL have pregnancy test day of placement.- misoprostol (CYTOTEC) 200 MCG tablet; Take 1 tablet (200 mcg) by mouth once for 1 dose Dispense: 1 tablet; Refill: 0  - misoprostol (CYTOTEC) 200 MCG tablet; Take 1 tablet (200 mcg) by mouth once for 1 dose             No follow-ups on file.    МАРИЯ Daniels Ra CNP  [facility] [place]    Subjective   Anat is a 27 year old who presents for the following health issues     HPI     She has been having high BP her birth control pill and was suggested to go either on the nexplanon or IUD.    Taking OCP.  Interested in other options.  No concerns regarding stds.  Monogamous.  Normal periods.  Prior to the pill she had heavy periods with a lot of cramping.  No hx of blood clots.  No smoking.  Normal pap smears in the past.  Due for a pap 3/2022.    Review of Systems   Constitutional, HEENT, cardiovascular, pulmonary, gi and gu systems are negative, except as otherwise noted.      Objective    Vitals - Patient Reported  Weight (Patient Reported): 59 kg (130 lb)  Pain  ready to quit Score: No Pain (0)      Vitals:  No vitals were obtained today due to virtual visit.    Physical Exam   GENERAL: Healthy, alert and no distress  EYES: Eyes grossly normal to inspection.  No discharge or erythema, or obvious scleral/conjunctival abnormalities.  RESP: No audible wheeze, cough, or visible cyanosis.  No visible retractions or increased work of breathing.    SKIN: Visible skin clear. No significant rash, abnormal pigmentation or lesions.  NEURO: Cranial nerves grossly intact.  Mentation and speech appropriate for age.  PSYCH: Mentation appears normal, affect normal/bright, judgement and insight intact, normal speech and appearance well-groomed.                Video-Visit Details    Type of service:  Video Visit    Video End Time:2:13 PM    Originating Location (pt. Location): Home    Distant Location (provider location):  [facility] [place]     Platform used for Video Visit: [employer]

## 2022-10-21 ENCOUNTER — EMERGENCY (EMERGENCY)
Facility: HOSPITAL | Age: 62
LOS: 1 days | Discharge: HOME | End: 2022-10-21
Attending: EMERGENCY MEDICINE | Admitting: EMERGENCY MEDICINE

## 2022-10-21 VITALS
HEIGHT: 60 IN | TEMPERATURE: 98 F | RESPIRATION RATE: 16 BRPM | OXYGEN SATURATION: 100 % | DIASTOLIC BLOOD PRESSURE: 84 MMHG | HEART RATE: 103 BPM | SYSTOLIC BLOOD PRESSURE: 156 MMHG

## 2022-10-21 VITALS
OXYGEN SATURATION: 100 % | HEART RATE: 96 BPM | RESPIRATION RATE: 15 BRPM | SYSTOLIC BLOOD PRESSURE: 169 MMHG | DIASTOLIC BLOOD PRESSURE: 98 MMHG | TEMPERATURE: 98 F

## 2022-10-21 PROBLEM — F32.9 MAJOR DEPRESSIVE DISORDER, SINGLE EPISODE, UNSPECIFIED: Chronic | Status: ACTIVE | Noted: 2021-11-15

## 2022-10-21 LAB
ALBUMIN SERPL ELPH-MCNC: 4.7 G/DL — SIGNIFICANT CHANGE UP (ref 3.3–5)
ALP SERPL-CCNC: 82 U/L — SIGNIFICANT CHANGE UP (ref 40–120)
ALT FLD-CCNC: 86 U/L — HIGH (ref 4–33)
ANION GAP SERPL CALC-SCNC: 19 MMOL/L — HIGH (ref 7–14)
APAP SERPL-MCNC: <10 UG/ML — LOW (ref 15–25)
AST SERPL-CCNC: 89 U/L — HIGH (ref 4–32)
BASOPHILS # BLD AUTO: 0.09 K/UL — SIGNIFICANT CHANGE UP (ref 0–0.2)
BASOPHILS NFR BLD AUTO: 1.5 % — SIGNIFICANT CHANGE UP (ref 0–2)
BILIRUB SERPL-MCNC: 0.3 MG/DL — SIGNIFICANT CHANGE UP (ref 0.2–1.2)
BUN SERPL-MCNC: 24 MG/DL — HIGH (ref 7–23)
CALCIUM SERPL-MCNC: 9.4 MG/DL — SIGNIFICANT CHANGE UP (ref 8.4–10.5)
CHLORIDE SERPL-SCNC: 97 MMOL/L — LOW (ref 98–107)
CO2 SERPL-SCNC: 22 MMOL/L — SIGNIFICANT CHANGE UP (ref 22–31)
CREAT SERPL-MCNC: 0.57 MG/DL — SIGNIFICANT CHANGE UP (ref 0.5–1.3)
EGFR: 103 ML/MIN/1.73M2 — SIGNIFICANT CHANGE UP
EOSINOPHIL # BLD AUTO: 0.01 K/UL — SIGNIFICANT CHANGE UP (ref 0–0.5)
EOSINOPHIL NFR BLD AUTO: 0.2 % — SIGNIFICANT CHANGE UP (ref 0–6)
ETHANOL SERPL-MCNC: 36 MG/DL — HIGH
GLUCOSE SERPL-MCNC: 91 MG/DL — SIGNIFICANT CHANGE UP (ref 70–99)
HCT VFR BLD CALC: 36.9 % — SIGNIFICANT CHANGE UP (ref 34.5–45)
HGB BLD-MCNC: 12.5 G/DL — SIGNIFICANT CHANGE UP (ref 11.5–15.5)
IANC: 4.63 K/UL — SIGNIFICANT CHANGE UP (ref 1.8–7.4)
IMM GRANULOCYTES NFR BLD AUTO: 0.5 % — SIGNIFICANT CHANGE UP (ref 0–0.9)
LYMPHOCYTES # BLD AUTO: 0.85 K/UL — LOW (ref 1–3.3)
LYMPHOCYTES # BLD AUTO: 13.8 % — SIGNIFICANT CHANGE UP (ref 13–44)
MCHC RBC-ENTMCNC: 31.3 PG — SIGNIFICANT CHANGE UP (ref 27–34)
MCHC RBC-ENTMCNC: 33.9 GM/DL — SIGNIFICANT CHANGE UP (ref 32–36)
MCV RBC AUTO: 92.5 FL — SIGNIFICANT CHANGE UP (ref 80–100)
MONOCYTES # BLD AUTO: 0.55 K/UL — SIGNIFICANT CHANGE UP (ref 0–0.9)
MONOCYTES NFR BLD AUTO: 8.9 % — SIGNIFICANT CHANGE UP (ref 2–14)
NEUTROPHILS # BLD AUTO: 4.63 K/UL — SIGNIFICANT CHANGE UP (ref 1.8–7.4)
NEUTROPHILS NFR BLD AUTO: 75.1 % — SIGNIFICANT CHANGE UP (ref 43–77)
NRBC # BLD: 0 /100 WBCS — SIGNIFICANT CHANGE UP (ref 0–0)
NRBC # FLD: 0 K/UL — SIGNIFICANT CHANGE UP (ref 0–0)
PLATELET # BLD AUTO: 383 K/UL — SIGNIFICANT CHANGE UP (ref 150–400)
POTASSIUM SERPL-MCNC: 4.6 MMOL/L — SIGNIFICANT CHANGE UP (ref 3.5–5.3)
POTASSIUM SERPL-SCNC: 4.6 MMOL/L — SIGNIFICANT CHANGE UP (ref 3.5–5.3)
PROT SERPL-MCNC: 7.5 G/DL — SIGNIFICANT CHANGE UP (ref 6–8.3)
RBC # BLD: 3.99 M/UL — SIGNIFICANT CHANGE UP (ref 3.8–5.2)
RBC # FLD: 14 % — SIGNIFICANT CHANGE UP (ref 10.3–14.5)
SALICYLATES SERPL-MCNC: <0.3 MG/DL — LOW (ref 15–30)
SODIUM SERPL-SCNC: 138 MMOL/L — SIGNIFICANT CHANGE UP (ref 135–145)
WBC # BLD: 6.16 K/UL — SIGNIFICANT CHANGE UP (ref 3.8–10.5)
WBC # FLD AUTO: 6.16 K/UL — SIGNIFICANT CHANGE UP (ref 3.8–10.5)

## 2022-10-21 PROCEDURE — 99285 EMERGENCY DEPT VISIT HI MDM: CPT

## 2022-10-21 RX ORDER — SODIUM CHLORIDE 9 MG/ML
1000 INJECTION INTRAMUSCULAR; INTRAVENOUS; SUBCUTANEOUS ONCE
Refills: 0 | Status: COMPLETED | OUTPATIENT
Start: 2022-10-21 | End: 2022-10-21

## 2022-10-21 RX ADMIN — Medication 50 MILLIGRAM(S): at 14:49

## 2022-10-21 RX ADMIN — Medication 50 MILLIGRAM(S): at 12:19

## 2022-10-21 RX ADMIN — SODIUM CHLORIDE 1000 MILLILITER(S): 9 INJECTION INTRAMUSCULAR; INTRAVENOUS; SUBCUTANEOUS at 12:15

## 2022-10-21 NOTE — ED PROVIDER NOTE - PATIENT PORTAL LINK FT
You can access the FollowMyHealth Patient Portal offered by [facility] by registering at the following website: [email or web address]. By joining [employer]’s FollowMyHealth portal, you will also be able to view your health information using other applications (apps) compatible with our system.

## 2022-10-21 NOTE — ED PROVIDER NOTE - CLINICAL SUMMARY MEDICAL DECISION MAKING FREE TEXT BOX
Joel: drinking for days. Now shaky and anxious. Hx of alcohol withdrawl. No trauma no sz. no bleeding. abd soft. Would start with librium and hydrate. Is currently interested in getting help. Joel: drinking for days. Now shaky and anxious. Hx of alcohol withdrawl. No trauma no sz. no bleeding. abd soft. Would start with librium and hydrate. Is currently interested in getting help. Will get SBIRT.

## 2022-10-21 NOTE — ED ADULT NURSE NOTE - NSIMPLEMENTINTERV_GEN_ALL_ED
Implemented All Fall with Harm Risk Interventions:  [place] to call system. Call bell, personal items and telephone within reach. Instruct patient to call for assistance. Room bathroom lighting operational. Non-slip footwear when patient is off stretcher. Physically safe environment: no spills, clutter or unnecessary equipment. Stretcher in lowest position, wheels locked, appropriate side rails in place. Provide visual cue, wrist band, yellow gown, etc. Monitor gait and stability. Monitor for mental status changes and reorient to person, place, and time. Review medications for side effects contributing to fall risk. Reinforce activity limits and safety measures with patient and family. Provide visual clues: red socks.
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
## 2022-10-21 NOTE — SBIRT NOTE ADULT - NSSBIRTALCACTIVEREFTXDET_GEN_A_CORE
Provided SBIRT services: Full screen positive. Referral to Treatment Performed. Screening results were reviewed with the patient and patient was provided information about healthy guidelines and potential negative consequences associated with level of risk. Motivation and readiness to reduce or stop use was discussed and goals and activities to make changes were suggested/offered.    Referral for complete assessment and level of care determination at a certified treatment facility was completed by contacting the treatment facility via phone. Appt details noted below:
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
## 2022-10-21 NOTE — ED ADULT NURSE NOTE - NS ED NURSE RECORD ANOTHER HT AND WT
Received fax from dawna Sena in 's in-basket to review on Tuesday.    Tiera, RN  Triage Nurse           No

## 2022-10-21 NOTE — ED PROVIDER NOTE - PROGRESS NOTE DETAILS
Yakelin Jeffries M.D. (Resident Physician): Pt stable and ready for d/c. Spoke with SBIRT. Pt has a spot for detox.

## 2022-10-21 NOTE — ED ADULT TRIAGE NOTE - CHIEF COMPLAINT QUOTE
Pt sent by Dr. Black for alcohol withdraw. Some tremors noted with arms extended. Pt states "I feel embarrassed", denying complaints. Pt denies H/A, hallucinations, diaphoresis. Denies CP, N/V. Last drink was yesterday morning. Phx: alcohol dependence, depression.

## 2022-10-21 NOTE — ED PROVIDER NOTE - PHYSICAL EXAMINATION
PHYSICAL EXAM:  GENERAL: Tearful and anxious  HENMT: Atraumatic, moist mucous membranes, no oropharyngeal exudates or vesicles, uvula is midline EYES: Clear bilaterally, PERRL, EOMs intact b/l  HEART: RRR, S1/S2, no murmur  RESPIRATORY: Clear to auscultation bilaterally, no wheezes/rhonchi/rales  ABDOMEN: +BS, soft, nontender, nondistended  EXTREMITIES: No lower extremity edema, +2 radial pulses b/l  NEURO: A&Ox4, no focal motor deficits or sensory deficits   SKIN:  Skin normal color for race, warm, dry and intact

## 2022-10-21 NOTE — ED ADULT NURSE REASSESSMENT NOTE - NS ED NURSE REASSESS COMMENT FT1
Break RN: Tiffanie, no signs of distress, SBIRT consult completed, pending recommendations, will give librium per orders, fall precautions maintained

## 2022-10-21 NOTE — ED PROVIDER NOTE - NSFOLLOWUPINSTRUCTIONS_ED_ALL_ED_FT
You have been evaluated in the Emergency Department today for alcohol withdrawal.    Please continue with your planned treatment at [facility].     Return to the Emergency Department if you experience visual/auditory/tactile hallucinations, persistent nausea and vomiting, worsening shaking, fevers 100.4°F or greater, or any other concerning symptoms.    Thank you for choosing us for your care.

## 2024-04-21 NOTE — PROGRESS NOTE ADULT - NSHPATTENDINGPLANDISCUSS_GEN_ALL_CORE
Patient: Tania Guerrero    Procedure Information       Date/Time: 04/21/24 1200    Procedure: CT ABDOMEN PELVIS W IV CONTRAST    Location: [facility]            Relevant Problems   Anesthesia   (+) PONV (postoperative nausea and vomiting)      Cardiac   (+) ASHD (arteriosclerotic heart disease)   (+) Hyperlipidemia   (+) Hypertension      Pulmonary   (+) COPD (chronic obstructive pulmonary disease) (Multi)      Neuro   (+) Carpal tunnel syndrome, bilateral upper limbs      Liver   (+) Hepatic hemangioma      Endocrine   (+) Hypothyroid      Musculoskeletal   (+) Carpal tunnel syndrome, bilateral upper limbs   (+) Primary osteoarthritis of both knees       Clinical information reviewed:   Tobacco  Allergies  Meds   Med Hx  Surg Hx   Fam Hx  Soc Hx        NPO Detail:  No data recorded     Physical Exam    Airway  Mallampati: II  TM distance: >3 FB  Neck ROM: full     Cardiovascular - normal exam     Dental - normal exam     Pulmonary - normal exam     Abdominal - normal exam         Anesthesia Plan    History of general anesthesia?: yes  History of complications of general anesthesia?: no    ASA 3     general     The patient is not a current smoker.    intravenous induction   Postoperative administration of opioids is intended.  Anesthetic plan and risks discussed with patient.  Use of blood products discussed with patient who.    Plan discussed with CRNA.  
patient, RN and ADS PA
patient, RN and ADS PA
patient, RN and ADS NP
patient and ADS NP